# Patient Record
Sex: FEMALE | Race: WHITE | NOT HISPANIC OR LATINO | Employment: OTHER | URBAN - METROPOLITAN AREA
[De-identification: names, ages, dates, MRNs, and addresses within clinical notes are randomized per-mention and may not be internally consistent; named-entity substitution may affect disease eponyms.]

---

## 2017-01-03 ENCOUNTER — GENERIC CONVERSION - ENCOUNTER (OUTPATIENT)
Dept: OTHER | Facility: OTHER | Age: 57
End: 2017-01-03

## 2017-01-05 ENCOUNTER — ALLSCRIPTS OFFICE VISIT (OUTPATIENT)
Dept: OTHER | Facility: OTHER | Age: 57
End: 2017-01-05

## 2017-03-21 ENCOUNTER — GENERIC CONVERSION - ENCOUNTER (OUTPATIENT)
Dept: OTHER | Facility: OTHER | Age: 57
End: 2017-03-21

## 2017-06-15 ENCOUNTER — GENERIC CONVERSION - ENCOUNTER (OUTPATIENT)
Dept: OTHER | Facility: OTHER | Age: 57
End: 2017-06-15

## 2017-06-27 ENCOUNTER — GENERIC CONVERSION - ENCOUNTER (OUTPATIENT)
Dept: OTHER | Facility: OTHER | Age: 57
End: 2017-06-27

## 2017-08-23 ENCOUNTER — TRANSCRIBE ORDERS (OUTPATIENT)
Dept: ADMINISTRATIVE | Facility: HOSPITAL | Age: 57
End: 2017-08-23

## 2017-08-23 DIAGNOSIS — R91.1 LUNG NODULE: Primary | ICD-10-CM

## 2017-08-23 DIAGNOSIS — E04.1 NONTOXIC SINGLE THYROID NODULE: ICD-10-CM

## 2017-08-23 DIAGNOSIS — Z12.31 ENCOUNTER FOR SCREENING MAMMOGRAM FOR MALIGNANT NEOPLASM OF BREAST: ICD-10-CM

## 2017-08-24 ENCOUNTER — TRANSCRIBE ORDERS (OUTPATIENT)
Dept: ADMINISTRATIVE | Facility: HOSPITAL | Age: 57
End: 2017-08-24

## 2017-08-24 DIAGNOSIS — R10.9 ABDOMINAL PAIN, UNSPECIFIED LOCATION: Primary | ICD-10-CM

## 2017-08-25 ENCOUNTER — HOSPITAL ENCOUNTER (OUTPATIENT)
Dept: RADIOLOGY | Age: 57
Discharge: HOME | End: 2017-08-25
Payer: OTHER GOVERNMENT

## 2017-08-25 DIAGNOSIS — R10.9 ABDOMINAL PAIN, UNSPECIFIED LOCATION: ICD-10-CM

## 2017-08-25 PROCEDURE — 76700 US EXAM ABDOM COMPLETE: CPT

## 2017-08-29 ENCOUNTER — HOSPITAL ENCOUNTER (OUTPATIENT)
Dept: RADIOLOGY | Facility: HOSPITAL | Age: 57
Discharge: HOME | End: 2017-08-29
Payer: OTHER GOVERNMENT

## 2017-08-29 ENCOUNTER — HOSPITAL ENCOUNTER (OUTPATIENT)
Dept: RADIOLOGY | Facility: HOSPITAL | Age: 57
Discharge: HOME | End: 2017-08-29
Attending: FAMILY MEDICINE
Payer: OTHER GOVERNMENT

## 2017-08-29 DIAGNOSIS — Z12.31 ENCOUNTER FOR SCREENING MAMMOGRAM FOR MALIGNANT NEOPLASM OF BREAST: ICD-10-CM

## 2017-08-29 DIAGNOSIS — R91.1 LUNG NODULE: ICD-10-CM

## 2017-08-29 PROCEDURE — 77063 BREAST TOMOSYNTHESIS BI: CPT

## 2017-08-29 PROCEDURE — G0202 SCR MAMMO BI INCL CAD: HCPCS

## 2017-08-29 PROCEDURE — 71260 CT THORAX DX C+: CPT

## 2017-08-29 RX ADMIN — IOHEXOL 85 ML: 350 INJECTION, SOLUTION INTRAVENOUS at 11:08

## 2017-09-01 ENCOUNTER — GENERIC CONVERSION - ENCOUNTER (OUTPATIENT)
Dept: OTHER | Facility: OTHER | Age: 57
End: 2017-09-01

## 2017-11-21 ENCOUNTER — TRANSCRIBE ORDERS (OUTPATIENT)
Dept: ADMINISTRATIVE | Facility: HOSPITAL | Age: 57
End: 2017-11-21

## 2017-11-21 DIAGNOSIS — R10.13 ABDOMINAL PAIN, EPIGASTRIC: Primary | ICD-10-CM

## 2017-11-30 ENCOUNTER — HOSPITAL ENCOUNTER (OUTPATIENT)
Dept: RADIOLOGY | Facility: HOSPITAL | Age: 57
Discharge: HOME | End: 2017-11-30
Payer: OTHER GOVERNMENT

## 2017-11-30 VITALS — BODY MASS INDEX: 21.24 KG/M2 | WEIGHT: 118 LBS

## 2017-11-30 DIAGNOSIS — R10.13 ABDOMINAL PAIN, EPIGASTRIC: ICD-10-CM

## 2017-11-30 PROCEDURE — 78227 HEPATOBIL SYST IMAGE W/DRUG: CPT

## 2017-11-30 PROCEDURE — A9537 TC99M MEBROFENIN: HCPCS

## 2018-01-11 NOTE — RESULT NOTES
Verified Results  CT CHEST W CONTRAST [id number] 11:03AM Guichodi Running     Test Name Result Flag Reference   CT CHEST W CONTRAST (Report)     CT CHEST WITH IV CONTRAST     INDICATION: Coughing  Follow-up pulmonary nodules described on outside radiographic chest series  The patient has no smoking history  COMPARISON: Chest radiographic series report dated 11/27/2016  TECHNIQUE: CT examination of the chest was performed  Axial, sagittal and coronal reformatted projections were created  This examination, like all CT scans performed in the [facility], was performed utilizing techniques to minimize    radiation dose exposure, including the use of iterative reconstruction and automated exposure control  IV Contrast: iohexol (OMNIPAQUE) 350 MG/ML injection (MULTI-DOSE) 85 mL Note: (SINGLE DOSE/MULTI DOSE) information refers to the container from which the contrast was acquired  Contrast was injected one time intravenously without immediate    complication  FINDINGS:     LUNGS: There are focal groundglass nodular infiltrates identified including in the left lower lobe (3/26) and within the lateral segment right middle lobe (3/33)  A short-term 3 month follow-up recommended to assess stability  Enhancing opacity in the   medial segment right middle lobe likely represents atelectasis which was described as a nodule on the recent chest radiographic series  Again, surveillance recommended  Finally, there is a 3 mm noncalcified nodule in the peripheral right lower lobe    (3/33)  According to guidelines by the Fleischner society (Radiology 2005; 614:484-004) no followup is needed in patients with low risk for lung cancer, but a 1 year followup is recommended in patients with higher risk for lung cancer such as smokers  Patients with a known malignancy who are therefore at increased risk of metastasis should receive three month follow-up initially  PLEURA: No pleural effusions  HEART/GREAT VESSELS: Unremarkable for patient's age  No pericardial effusion  MEDIASTINUM AND NICK: Several tiny calcified right paramediastinal lymph nodes likely granulomatous  CHEST WALL AND LOWER NECK: Unremarkable  VISUALIZED STRUCTURES IN THE UPPER ABDOMEN: Unremarkable  OSSEOUS STRUCTURES: No acute fracture  No destructive osseous lesion  IMPRESSION:       1  Scattered right middle and left lower lobe groundglass infiltrates likely infectious or inflammatory  In addition, enhancing opacity in the medial segment right middle lobe is most consistent with round atelectasis  A 3 month follow-up however is    recommended for the described findings  2  3 mm right lower lobe nodule  According to guidelines by the Fleischner society (Radiology 2005; 401:885-108) no followup is needed in patients with low risk for lung cancer, but a 1 year followup is recommended in patients with higher risk for lung    cancer such as smokers  Patients with a known malignancy who are therefore at increased risk of metastasis should receive three month follow-up initially        ##fuslh3##fuslh3       ##sigslh##sigslh       Workstation performed: [id number]     Signed by:   Patrica Pizarro MD   12/7/16       Plan  Cough    · Doxycycline Hyclate 100 MG Oral Tablet; TAKE 1 TABLET EVERY 12 HOURS  DAILY

## 2018-01-12 NOTE — RESULT NOTES
Verified Results  (1) URINE CULTURE [id number] 12:00AM Tomi Button     Test Name Result Flag Reference   Result 1 Escherichia coli A    Greater than 100,000 colony forming units per mL   Urine Culture,Comprehensive Final report A    Antimicrobial Susceptibility Comment     ** S = Susceptible; I = Intermediate; R = Resistant **                     P = Positive; N = Negative              MICS are expressed in micrograms per mL     Antibiotic                 RSLT#1    RSLT#2    RSLT#3    RSLT#4  Amoxicillin/Clavulanic Acid    S  Ampicillin                     R  Cefepime                       S  Ceftriaxone                    S  Cephalothin                    I  Ciprofloxacin                  S  Ertapenem                      S  Gentamicin                     S  Imipenem                       S  Levofloxacin                   S  Nitrofurantoin                 S  Piperacillin                   R  Tetracycline                   R  Tobramycin                     S  Trimethoprim/Sulfa             R   Result 1 Escherichia coli A    Greater than 100,000 colony forming units per mL   Urine Culture,Comprehensive Final report A    Antimicrobial Susceptibility Comment     ** S = Susceptible; I = Intermediate; R = Resistant **                     P = Positive; N = Negative              MICS are expressed in micrograms per mL     Antibiotic                 RSLT#1    RSLT#2    RSLT#3    RSLT#4  Amoxicillin/Clavulanic Acid    S  Ampicillin                     R  Cefepime                       S  Ceftriaxone                    S  Cephalothin                    I  Ciprofloxacin                  S  Ertapenem                      S  Gentamicin                     S  Imipenem                       S  Levofloxacin                   S  Nitrofurantoin                 S  Piperacillin                   R  Tetracycline                   R  Tobramycin                     S  Trimethoprim/Sulfa             R             Discussion/Summary changed bactrim to cipro

## 2018-01-14 VITALS
DIASTOLIC BLOOD PRESSURE: 80 MMHG | SYSTOLIC BLOOD PRESSURE: 130 MMHG | BODY MASS INDEX: 20.49 KG/M2 | WEIGHT: 120 LBS | HEIGHT: 64 IN

## 2018-01-14 NOTE — MISCELLANEOUS
Message  pt called stating she tried calling the cardiologist and they could not accommodate any apt until January 2017  I spoke with Simone Hankins- stated ok to see primary care and get a EKG and ECHO  made patient aware of recommendations  Active Problems    1  Acute UTI (599 0) (N39 0)   2  Body mass index (BMI) of 20 0 to 20 9 in adult (V85 1) (Z68 20)   3  Chest pain, unspecified type (786 50) (R07 9)   4  Colon cancer screening (V76 51) (Z12 11)   5  Dense breasts (793 82) (R92 2)   6  Encounter for gynecological examination without abnormal finding (V72 31) (Z01 419)   7  Encounter for screening mammogram for malignant neoplasm of breast (V76 12)   (Z12 31)   8  Encounter for screening mammogram for malignant neoplasm of breast (V76 12)   (Z12 31)   9  Fatigue (780 79) (R53 83)   10  Hereditary breast and ovarian cancer syndrome (V84 09) (Z15 09)   11  Left foot pain (729 5) (M79 672)   12  Pre-op testing (V72 84) (Z01 818)   13  Routine health maintenance (V70 0) (Z00 00)   14  Screening for cardiovascular condition (V81 2) (Z13 6)    Current Meds   1  No Reported Medications Recorded    Allergies    1   Penicillin V Potassium TABS    Signatures   Electronically signed by : Chavo Epps LPN; Nov 30 8106  8:89QA EST                       (Author)

## 2018-01-15 NOTE — MISCELLANEOUS
Message  pt called re cough and "lung spasms"  seen last week, tx with zpack  proair called today  she states she has proair and wanted flovent (her last doctor did that for her)  I advised she should be seen if needs steroid inhaler and evaluated  she is new pt and we can't refill things we have no record of  refused to use mucinex products since "they don't help"  advised appt tomorrow, she may need cxr pending eval      Signatures   Electronically signed by : Anthony Curiel DO;  Apr 15 2016  6:51PM EST                       (Author)

## 2018-01-15 NOTE — RESULT NOTES
Verified Results  * XR FOOT 3+ VIEW LEFT [id number] 03:22PM Liborio Olivarez     Test Name Result Flag Reference   XR FOOT 3+ VW LEFT (Report)     LEFT FOOT     INDICATION: Pain in the left 3rd, 4th and 5th metatarsals  No trauma  COMPARISON: None     VIEWS: 3; 3 images     FINDINGS:     There is no acute fracture or dislocation  No degenerative changes  No lytic or blastic lesions are seen  Soft tissues are unremarkable  IMPRESSION:     Normal examination         Workstation performed: [id number]     Signed by:   Arcelia Webster MD   1/3/17       Discussion/Summary   Perfecto Hopper,   Your foot x-ray is normal   Dr Cece Johansen

## 2018-01-16 NOTE — RESULT NOTES
Verified Results  (1) CBC/PLT/DIFF [id number] 10:13AM Sotero Fernandez     Test Name Result Flag Reference   WBC 9 5 x10E3/uL  3 4-10 8   RBC 4 35 x10E6/uL  3 77-5 28   Hemoglobin 13 1 g/dL  11 1-15 9   Hematocrit 38 6 %  34 0-46  6   MCV 89 fL  79-97   MCH 30 1 pg  26 6-33 0   MCHC 33 9 g/dL  31 5-35 7   RDW 13 6 %  12 3-15 4   Platelets 165 R00H8/LX  150-379   Neutrophils 59 %     Lymphs 29 %     Monocytes 9 %     Eos 3 %     Basos 0 %     Neutrophils (Absolute) 5 5 x10E3/uL  1 4-7 0   Lymphs (Absolute) 2 8 x10E3/uL  0 7-3 1   Monocytes(Absolute) 0 9 x10E3/uL  0 1-0 9   Eos (Absolute) 0 3 x10E3/uL  0 0-0 4   Baso (Absolute) 0 0 x10E3/uL  0 0-0 2   Immature Granulocytes 0 %     Immature Grans (Abs) 0 0 x10E3/uL  0 0-0 1     (1) COMPREHENSIVE METABOLIC PANEL [id number] 64:04TG Sotero Fernandez     Test Name Result Flag Reference   Glucose, Serum 87 mg/dL  65-99   BUN 15 mg/dL  6-24   Creatinine, Serum 0 69 mg/dL  0 57-1 00   eGFR If NonAfricn Am 97 mL/min/1 73  >59   eGFR If Africn Am 112 mL/min/1 73  >59   BUN/Creatinine Ratio 22  9-23   Sodium, Serum 138 mmol/L  136-144   Potassium, Serum 4 6 mmol/L  3 5-5 2   Chloride, Serum 98 mmol/L     Carbon Dioxide, Total 25 mmol/L  18-29   Calcium, Serum 9 2 mg/dL  8 7-10 2   Protein, Total, Serum 6 4 g/dL  6 0-8 5   Albumin, Serum 4 2 g/dL  3 5-5 5   Globulin, Total 2 2 g/dL  1 5-4 5   A/G Ratio 1 9  1 1-2 5   Bilirubin, Total <0 2 mg/dL  0 0-1 2   Alkaline Phosphatase, S 59 IU/L     AST (SGOT) 20 IU/L  0-40   ALT (SGPT) 13 IU/L  0-32     (1) LIPID PANEL FASTING W DIRECT LDL REFLEX [id number] 10:13AM Sotero Fernandez     Test Name Result Flag Reference   Cholesterol, Total 199 mg/dL  100-199   Triglycerides 41 mg/dL  0-149   HDL Cholesterol 81 mg/dL  >39   LDL Cholesterol Calc 110 mg/dL H 0-99     (1) TSH [id number] 10:13AM Sotero Fernandez     Test Name Result Flag Reference   TSH 1 900 uIU/mL  0 450-4 500     General acute hospital) Cardiovascular Risk Assessment [id number] 10:13AM Sotero Fernandez Test Name Result Flag Reference   Interpretation Note     Supplement report is available  PDF Image   (LC) Lyme Ab/Western Blot Reflex [id number] 10:13AM Zofia Peralta     Test Name Result Flag Reference   Lyme IgG/IgM Ab 0 91 ISR H 0 00-0 90   Negative         <0 91                                                 Equivocal  0 91 - 1 09                                                 Positive         >1 09   Lyme Disease Ab, Quant, IgM 1 24 index H 0 00-0 79   Negative         <0 80                                                 Equivocal  0 80 - 1 19                                                 Positive         >1 19                  IgM levels may peak at 3-6 weeks post infection, then                  gradually decline  IgG P93 Ab  Absent     IgG P66 Ab  Present A    IgG P58 Ab  Present A    IgG P45 Ab  Absent     IgG P41 Ab  Present A    IgG P39 Ab  Absent     IgG P30 Ab  Present A    IgG P28 Ab  Absent     IgG P23 Ab  Absent     IgG P18 Ab  Absent     Lyme IgG WB Interp  Negative     Positive: 5 of the following                                                Borrelia-specific bands:                                                18,23,28,30,39,41,45,58,                                                66, and 93  Negative: No bands or banding                                                patterns which do not                                                meet positive criteria  IgM P41 Ab  Present A    IgM P39 Ab  Absent     IgM P23 Ab  Absent     Lyme IgM WB Interp  Negative     Note: An equivocal or positive EIA result followed by a negative  Western Blot result is considered NEGATIVE  An equivocal or positive  EIA result followed by a positive Western Blot is considered POSITIVE  by the CDC  Positive: 2 of the following bands: 23,39 or 41  Negative: No bands or banding patterns which do not meet positive  criteria    Criteria for positivity are those recommended by CDC/ASTPHLD   p23=Osp C, l05=sgaxsstii  Note:  Sera from individuals with the following may cross react in the  Lyme Western Blot assays: other spirochetal diseases (periodontal  disease, leptospirosis, relapsing fever, yaws, and pinta);  connective autoimmune (Rheumatoid Arthritis and Systemic Lupus  Erythematosus and also individuals with Antinuclear Antibody);  other infections COFFEE [facility] Spotted Fever; Ankur-Barr Virus,  and Cytomegalovirus)  Discussion/Summary   Juanpablo Huizar,   Your blood count, sugar, kidney function, liver function, cholesterol and thyroid are normal    Lyme test is negative     Dr German Saucedo

## 2018-01-16 NOTE — RESULT NOTES
Verified Results  CT CHEST W CONTRAST [id number] 10:26AM Sotero Fernandez     Test Name Result Flag Reference   CT CHEST W CONTRAST (Report)     CT CHEST WITH IV CONTRAST     INDICATION: 58-year-old female for follow-up pulmonary nodule  The patient has no smoking history  COMPARISON: 12/6/2016  TECHNIQUE: CT examination of the chest was performed  Reformatted images were created in axial, sagittal, and coronal planes  Radiation dose length product (DLP) for this visit: 161 36 mGy-cm   This examination, like all CT scans performed in the [facility], was performed utilizing techniques to minimize radiation dose exposure, including the use of iterative   reconstruction and automated exposure control  IV Contrast: 85 mL of iohexol (OMNIPAQUE)        FINDINGS:     LUNGS: There has been resolution of the right middle and left lower lobe groundglass infiltrates likely infectious or inflammatory  The 3 mm right lower lobe nodule has diminished in conspicuity with no new parenchymal or endobronchial lesions seen  PLEURA: No pleural effusions or pneumothorax  HEART/GREAT VESSELS: Unremarkable for patient's age  MEDIASTINUM AND NICK: No pathologic lymphadenopathy  CHEST WALL AND LOWER NECK: Benign-appearing low-density 7 mm right upper pole nodule noted  VISUALIZED STRUCTURES IN THE UPPER ABDOMEN: Unremarkable  OSSEOUS STRUCTURES: No acute fracture  No destructive osseous lesion  IMPRESSION:       1  Interval resolution of right middle and left lower lobe groundglass infiltrates, likely infectious or inflammatory  2  Diminished size and conspicuity of 3 mm right lower lobe nodule  No further surveillance required  3  Suspect 7 mm right upper pole thyroid nodule  A baseline nonemergent thyroid ultrasound recommended               Workstation performed: [id number]     Signed by:   Patrica Pizarro MD   8/30/17

## 2018-05-12 ENCOUNTER — HOSPITAL ENCOUNTER (EMERGENCY)
Facility: HOSPITAL | Age: 58
Discharge: HOME | End: 2018-05-12
Attending: EMERGENCY MEDICINE | Admitting: EMERGENCY MEDICINE
Payer: COMMERCIAL

## 2018-05-12 ENCOUNTER — APPOINTMENT (EMERGENCY)
Dept: RADIOLOGY | Facility: HOSPITAL | Age: 58
End: 2018-05-12
Payer: COMMERCIAL

## 2018-05-12 VITALS
WEIGHT: 118 LBS | SYSTOLIC BLOOD PRESSURE: 131 MMHG | DIASTOLIC BLOOD PRESSURE: 78 MMHG | BODY MASS INDEX: 20.91 KG/M2 | TEMPERATURE: 97.4 F | OXYGEN SATURATION: 97 % | RESPIRATION RATE: 18 BRPM | HEIGHT: 63 IN | HEART RATE: 63 BPM

## 2018-05-12 DIAGNOSIS — S16.1XXA CERVICAL STRAIN, ACUTE: ICD-10-CM
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
## 2018-05-12 PROCEDURE — 72125 CT NECK SPINE W/O DYE: CPT

## 2018-05-12 PROCEDURE — 99283 EMERGENCY DEPT VISIT LOW MDM: CPT

## 2018-05-12 RX ORDER — METHOCARBAMOL 500 MG/1
500 TABLET, FILM COATED ORAL 2 TIMES DAILY
Qty: 20 TABLET | Refills: 0 | Status: SHIPPED | OUTPATIENT
Start: 2018-05-12 | End: 2018-06-08

## 2018-05-12 RX ORDER — METHOCARBAMOL 500 MG/1
500 TABLET, FILM COATED ORAL ONCE
Status: COMPLETED | OUTPATIENT
Start: 2018-05-12 | End: 2018-05-12

## 2018-05-12 RX ADMIN — METHOCARBAMOL 500 MG: 500 TABLET ORAL at 09:56
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
## 2018-05-12 NOTE — DISCHARGE INSTRUCTIONS
Cervical Strain   WHAT YOU NEED TO KNOW:   A cervical strain is a stretched or torn muscle or tendon in your neck  Tendons are strong tissues that connect muscles to bones  Common causes of cervical strains include a car accident, a fall, or a sports injury  DISCHARGE INSTRUCTIONS:   Return to the emergency department if:   · You have pain or numbness from your shoulder down to your hand  · You have problems with your vision, hearing, or balance  · You feel confused or cannot concentrate  · You have problems with movement and strength  Contact your healthcare provider if:   · You have increased swelling or pain in your neck  · You have questions or concerns about your condition or care  Medicines: You may need any of the following:  · Acetaminophen  decreases pain and fever  It is available without a doctor's order  Ask how much to take and how often to take it  Follow directions  Read the labels of all other medicines you are using to see if they also contain acetaminophen, or ask your doctor or pharmacist  Acetaminophen can cause liver damage if not taken correctly  Do not use more than 4 grams (4,000 milligrams) total of acetaminophen in one day  · NSAIDs , such as ibuprofen, help decrease swelling, pain, and fever  This medicine is available with or without a doctor's order  NSAIDs can cause stomach bleeding or kidney problems in certain people  If you take blood thinner medicine, always ask your healthcare provider if NSAIDs are safe for you  Always read the medicine label and follow directions  · Muscle relaxers  help decrease pain and muscle spasms  · Prescription pain medicine  may be given  Ask your healthcare provider how to take this medicine safely  Some prescription pain medicines contain acetaminophen  Do not take other medicines that contain acetaminophen without talking to your healthcare provider  Too much acetaminophen may cause liver damage   Prescription pain medicine may cause constipation  Ask your healthcare provider how to prevent or treat constipation  · Take your medicine as directed  Contact your healthcare provider if you think your medicine is not helping or if you have side effects  Tell him or her if you are allergic to any medicine  Keep a list of the medicines, vitamins, and herbs you take  Include the amounts, and when and why you take them  Bring the list or the pill bottles to follow-up visits  Carry your medicine list with you in case of an emergency  Manage your symptoms:   · Apply heat  on your neck for 15 to 20 minutes, 4 to 6 times a day or as directed  Heat helps decrease pain, stiffness, and muscle spasms  · Begin gentle neck exercises  as soon as you can move your neck without pain  Exercises will help decrease stiffness and improve the strength and movement of your neck  Ask your healthcare provider what kind of exercises you should do  · Gradually return to your usual activities as directed  Stop if you have pain  Avoid activities that can cause more damage to your neck, such as heavy lifting or strenuous exercise  · Sleep without a pillow  to help decrease pain  Instead, roll a small towel tightly and place it under your neck  · Go to physical therapy as directed  A physical therapist teaches you exercises to help improve movement and strength, and to decrease pain  Prevent neck injury:   · Drive safely  Make sure everyone in your car wears a seatbelt  A seatbelt can save your life if you are in an accident  Do not use your cell phone when you are driving  This could distract you and cause an accident  Pull over if you need to make a call or send a text message  · Wear helmets, lifejackets, and protective gear  Always wear a helmet when you ride a bike or motorcycle, go skiing, or play sports that could cause a head injury  Wear protective equipment when you play sports   Wear a lifejacket when you are on a boat or doing water sports  Follow up with your healthcare provider as directed: You may be referred to an orthopedist or physical therapies  Write down your questions so you remember to ask them during your visits  © 2017 [employer] Information is for End User's use only and may not be sold, redistributed or otherwise used for commercial purposes  All illustrations and images included in CareNotes® are the copyrighted property of A D [employer] M [employer] Inc  or [employer]  The above information is an  only  It is not intended as medical advice for individual conditions or treatments  Talk to your doctor, nurse or pharmacist before following any medical regimen to see if it is safe and effective for you

## 2018-05-15 ENCOUNTER — VBI (OUTPATIENT)
Dept: ADMINISTRATIVE | Facility: OTHER | Age: 58
End: 2018-05-15

## 2018-05-25 ENCOUNTER — TELEPHONE (OUTPATIENT)
Dept: FAMILY MEDICINE CLINIC | Facility: CLINIC | Age: 58
End: 2018-05-25

## 2018-05-25 DIAGNOSIS — M54.2 NECK PAIN: Primary | ICD-10-CM
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
## 2018-05-26 NOTE — TELEPHONE ENCOUNTER
Called and phone just kept ringing, could not leave message  Order in chart if she calls or PT has any questions  Yumiko Card

## 2018-05-29 ENCOUNTER — TELEPHONE (OUTPATIENT)
Dept: FAMILY MEDICINE CLINIC | Facility: CLINIC | Age: 58
End: 2018-05-29

## 2018-05-29 ENCOUNTER — EVALUATION (OUTPATIENT)
Dept: PHYSICAL THERAPY | Facility: CLINIC | Age: 58
End: 2018-05-29
Payer: COMMERCIAL

## 2018-05-29 DIAGNOSIS — M54.2 CERVICALGIA: Primary | ICD-10-CM

## 2018-05-29 DIAGNOSIS — M54.2 NECK PAIN: Primary | ICD-10-CM

## 2018-05-29 PROCEDURE — 97161 PT EVAL LOW COMPLEX 20 MIN: CPT

## 2018-05-29 PROCEDURE — G8985 CARRY GOAL STATUS: HCPCS

## 2018-05-29 PROCEDURE — G8984 CARRY CURRENT STATUS: HCPCS

## 2018-05-29 NOTE — TELEPHONE ENCOUNTER
She needs to be evaluated in the office if she not doing well on her current medication     Radha Andrade, DO
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
## 2018-05-30 RX ORDER — NABUMETONE 500 MG/1
500 TABLET, FILM COATED ORAL 2 TIMES DAILY PRN
Qty: 30 TABLET | Refills: 0 | Status: SHIPPED | OUTPATIENT
Start: 2018-05-30 | End: 2018-06-08

## 2018-05-30 NOTE — TELEPHONE ENCOUNTER
5/30/2018 10:14 AM returned call to [facility] and she would just like a stronger anti-inflammatory  We discussed options and started her on relafen    Risks and benefits of medication discussed     Aylin Cancino, DO

## 2018-05-31 ENCOUNTER — APPOINTMENT (OUTPATIENT)
Dept: PHYSICAL THERAPY | Facility: CLINIC | Age: 58
End: 2018-05-31
Payer: COMMERCIAL

## 2018-06-08 ENCOUNTER — OFFICE VISIT (OUTPATIENT)
Dept: FAMILY MEDICINE CLINIC | Facility: CLINIC | Age: 58
End: 2018-06-08
Payer: COMMERCIAL

## 2018-06-08 VITALS
BODY MASS INDEX: 21.26 KG/M2 | RESPIRATION RATE: 18 BRPM | WEIGHT: 120 LBS | DIASTOLIC BLOOD PRESSURE: 88 MMHG | SYSTOLIC BLOOD PRESSURE: 120 MMHG | HEART RATE: 82 BPM | HEIGHT: 63 IN | TEMPERATURE: 98.7 F

## 2018-06-08 DIAGNOSIS — S16.1XXA STRAIN OF NECK MUSCLE, INITIAL ENCOUNTER: ICD-10-CM

## 2018-06-08 DIAGNOSIS — M54.2 CERVICALGIA: Primary | ICD-10-CM

## 2018-06-08 PROCEDURE — 99213 OFFICE O/P EST LOW 20 MIN: CPT | Performed by: NURSE PRACTITIONER
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
## 2018-06-08 NOTE — LETTER
June 8, 2018     Patient: Estee Frausto   YOB: 1960   Date of Visit: 6/8/2018       To Whom it May Concern: Chao Benny is under my professional care  She was seen in my office on 6/8/2018  If you have any questions or concerns, please don't hesitate to call           Sincerely,          PATI Martinez        CC: No Recipients

## 2018-06-15 ENCOUNTER — TELEPHONE (OUTPATIENT)
Dept: FAMILY MEDICINE CLINIC | Facility: CLINIC | Age: 58
End: 2018-06-15

## 2018-06-15 DIAGNOSIS — M54.2 CERVICALGIA: Primary | ICD-10-CM

## 2018-06-15 RX ORDER — LIDOCAINE 50 MG/G
1 PATCH TOPICAL DAILY
Qty: 30 PATCH | Refills: 0 | Status: SHIPPED | OUTPATIENT
Start: 2018-06-15 | End: 2019-12-05 | Stop reason: ALTCHOICE

## 2018-06-28 ENCOUNTER — TELEPHONE (OUTPATIENT)
Dept: PHYSICAL THERAPY | Facility: CLINIC | Age: 58
End: 2018-06-28

## 2018-06-28 PROBLEM — M54.2 CERVICALGIA: Status: RESOLVED | Noted: 2018-06-15 | Resolved: 2018-06-28

## 2018-06-28 NOTE — TELEPHONE ENCOUNTER
Spoke with patient re: lack of attendance in PT  She stated she phoned and cancelled all appointment with us

## 2018-07-17 ENCOUNTER — OFFICE VISIT (OUTPATIENT)
Dept: FAMILY MEDICINE CLINIC | Facility: CLINIC | Age: 58
End: 2018-07-17
Payer: OTHER GOVERNMENT

## 2018-07-17 VITALS
WEIGHT: 119 LBS | BODY MASS INDEX: 21.09 KG/M2 | RESPIRATION RATE: 18 BRPM | TEMPERATURE: 98 F | SYSTOLIC BLOOD PRESSURE: 146 MMHG | DIASTOLIC BLOOD PRESSURE: 84 MMHG | HEIGHT: 63 IN | HEART RATE: 84 BPM

## 2018-07-17 DIAGNOSIS — R05.9 COUGH: ICD-10-CM

## 2018-07-17 DIAGNOSIS — Z13.6 SCREENING FOR CARDIOVASCULAR CONDITION: ICD-10-CM

## 2018-07-17 DIAGNOSIS — R53.83 FATIGUE, UNSPECIFIED TYPE: ICD-10-CM

## 2018-07-17 DIAGNOSIS — E04.1 THYROID NODULE: ICD-10-CM
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
## 2018-07-17 DIAGNOSIS — S16.1XXD CERVICAL MYOFASCIAL STRAIN, SUBSEQUENT ENCOUNTER: ICD-10-CM

## 2018-07-17 PROCEDURE — 99213 OFFICE O/P EST LOW 20 MIN: CPT | Performed by: NURSE PRACTITIONER

## 2018-07-17 PROCEDURE — 99214 OFFICE O/P EST MOD 30 MIN: CPT | Performed by: NURSE PRACTITIONER
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
## 2018-07-17 NOTE — PROGRESS NOTES
Assessment/Plan:    Fatigue new  Labs ordered  Reviewed CT of chest, which demonstrated 7mm thyroid nodule  Thyroid US ordered for further evaluation  Follow up after studies  Problem List Items Addressed This Visit        Endocrine    Thyroid nodule    Relevant Orders    US thyroid    TSH, 3rd generation with Free T4 reflex    Vitamin D 25 hydroxy      Other Visit Diagnoses     Fatigue, unspecified type    -  Primary    Relevant Orders    US thyroid    CBC and differential    Comprehensive metabolic panel    EBV acute panel    Vitamin D 25 hydroxy    Lyme Antibody Profile with reflex to WB    Cough        Relevant Orders    Vitamin D 25 hydroxy    Screening for cardiovascular condition        Relevant Orders    Lipid panel                                          There are no Patient Instructions on file for this visit  Return for Next scheduled follow up  Subjective:      Patient ID: Salvatore Carmen is a 60 y o  female  Chief Complaint   Patient presents with    Follow-up     prior [facility]-       Here today with complaints of excessive fatigue  This has been ongoing for the past several weeks  Some days she feels ready to go to bed at 7pm     She is concerned because she had a lung nodule and is thinks this may be related  Due for labs  She has had an on and off cough which she attributes to allergies  No personal history of thyroid disease, however was told that she had a nodule, for which an US was ordered, but she did not go  No family history of thyroid disease  No rashes or recent illness, but her throat does feel a little sore today  The following portions of the patient's history were reviewed and updated as appropriate: allergies, current medications, past family history, past medical history, past social history, past surgical history and problem list     Review of Systems   Constitutional: Positive for fatigue   Negative for appetite change, chills, fever and unexpected weight change  Respiratory: Negative for cough, shortness of breath and wheezing  Cardiovascular: Negative for chest pain, palpitations and leg swelling  Gastrointestinal: Negative for abdominal distention, constipation, nausea and vomiting  Musculoskeletal: Negative for arthralgias  Skin: Negative for rash  Neurological: Negative for weakness and headaches  All other systems reviewed and are negative  Current Outpatient Prescriptions   Medication Sig Dispense Refill    lidocaine (LIDODERM) 5 % Place 1 patch on the skin daily Remove & Discard patch within 12 hours or as directed by MD 30 patch 0    RESTASIS MULTIDOSE 0 05 % ophthalmic emulsion        No current facility-administered medications for this visit  Objective:    /84   Pulse 84   Temp 98 °F (36 7 °C)   Resp 18   Ht 5' 3" (1 6 m)   Wt 54 kg (119 lb)   BMI 21 08 kg/m²        Physical Exam   Constitutional: She appears well-developed and well-nourished  HENT:   Right Ear: Tympanic membrane, external ear and ear canal normal    Left Ear: Tympanic membrane, external ear and ear canal normal    Nose: No mucosal edema  Mouth/Throat: Oropharynx is clear and moist and mucous membranes are normal    Eyes: Conjunctivae are normal    Cardiovascular: Normal rate, regular rhythm and normal heart sounds  Pulmonary/Chest: Effort normal and breath sounds normal    Abdominal: Bowel sounds are normal  She exhibits no distension  There is no splenomegaly or hepatomegaly  There is no tenderness  Lymphadenopathy:        Right cervical: No superficial cervical adenopathy present  Left cervical: No superficial cervical adenopathy present  Skin: No rash noted  Psychiatric: She has a normal mood and affect  Nursing note and vitals reviewed               Jennifer Douglas

## 2018-12-14 ENCOUNTER — TRANSCRIBE ORDERS (OUTPATIENT)
Dept: ADMINISTRATIVE | Facility: HOSPITAL | Age: 58
End: 2018-12-14

## 2018-12-14 DIAGNOSIS — Z12.39 SCREENING BREAST EXAMINATION: Primary | ICD-10-CM

## 2018-12-14 DIAGNOSIS — Z80.3 FAMILY HISTORY OF BREAST CANCER: ICD-10-CM

## 2018-12-18 ENCOUNTER — HOSPITAL ENCOUNTER (OUTPATIENT)
Dept: RADIOLOGY | Facility: HOSPITAL | Age: 58
Discharge: HOME | End: 2018-12-18
Payer: OTHER GOVERNMENT

## 2018-12-18 ENCOUNTER — TELEPHONE (OUTPATIENT)
Dept: OBGYN CLINIC | Facility: CLINIC | Age: 58
End: 2018-12-18

## 2018-12-18 VITALS — HEIGHT: 63 IN | WEIGHT: 120 LBS | BODY MASS INDEX: 21.26 KG/M2

## 2018-12-18 DIAGNOSIS — Z12.31 ENCOUNTER FOR SCREENING MAMMOGRAM FOR MALIGNANT NEOPLASM OF BREAST: Primary | ICD-10-CM

## 2018-12-18 DIAGNOSIS — Z80.3 FAMILY HISTORY OF BREAST CANCER: ICD-10-CM

## 2018-12-18 DIAGNOSIS — Z12.39 SCREENING BREAST EXAMINATION: ICD-10-CM

## 2018-12-18 DIAGNOSIS — R53.83 FATIGUE, UNSPECIFIED TYPE: ICD-10-CM

## 2018-12-18 DIAGNOSIS — E04.1 THYROID NODULE: ICD-10-CM

## 2018-12-18 PROCEDURE — 77067 SCR MAMMO BI INCL CAD: CPT

## 2018-12-18 PROCEDURE — 77063 BREAST TOMOSYNTHESIS BI: CPT

## 2018-12-18 PROCEDURE — 76536 US EXAM OF HEAD AND NECK: CPT

## 2019-07-24 ENCOUNTER — TELEPHONE (OUTPATIENT)
Dept: FAMILY MEDICINE CLINIC | Facility: CLINIC | Age: 59
End: 2019-07-24
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
## 2019-12-05 ENCOUNTER — ANNUAL EXAM (OUTPATIENT)
Dept: OBGYN CLINIC | Facility: CLINIC | Age: 59
End: 2019-12-05
Payer: OTHER GOVERNMENT

## 2019-12-05 VITALS
WEIGHT: 116 LBS | BODY MASS INDEX: 20.55 KG/M2 | HEIGHT: 63 IN | DIASTOLIC BLOOD PRESSURE: 78 MMHG | SYSTOLIC BLOOD PRESSURE: 128 MMHG

## 2019-12-05 DIAGNOSIS — Z11.51 SCREENING FOR HUMAN PAPILLOMAVIRUS (HPV): ICD-10-CM

## 2019-12-05 DIAGNOSIS — Z01.419 ENCOUNTER FOR GYNECOLOGICAL EXAMINATION: Primary | ICD-10-CM

## 2019-12-05 DIAGNOSIS — Z12.31 ENCOUNTER FOR SCREENING MAMMOGRAM FOR MALIGNANT NEOPLASM OF BREAST: ICD-10-CM

## 2019-12-05 PROCEDURE — 87624 HPV HI-RISK TYP POOLED RSLT: CPT | Performed by: OBSTETRICS & GYNECOLOGY

## 2019-12-05 PROCEDURE — G0145 SCR C/V CYTO,THINLAYER,RESCR: HCPCS | Performed by: OBSTETRICS & GYNECOLOGY

## 2019-12-05 PROCEDURE — 99396 PREV VISIT EST AGE 40-64: CPT | Performed by: OBSTETRICS & GYNECOLOGY

## 2019-12-05 NOTE — PROGRESS NOTES
Dyana Uribe   1960    CC:  Yearly exam    S:  60 y o  female here for yearly exam  She is postmenopausal and has had no vaginal bleeding  She denies vaginal discharge, itching, odor or dryness  She had undergone a lap BSO secondary to her family history of breast/ovarian cancer  Her sister had the same surgery with another surgeon but it was apparently complicated by intraperitoneal hemorrhage  She states her sister is now doing well  Sexual activity: She is sexually active without pain, bleeding or dryness  Last Pap: 7/2015 - normal per records  Last Mammo: 12/18/2018 - BIRAD-1  Last Colonoscopy: 5 years ago; normal per patient    We reviewed ASCCP guidelines for Pap testing  Current Outpatient Medications:     RESTASIS MULTIDOSE 0 05 % ophthalmic emulsion, , Disp: , Rfl:   Social History     Socioeconomic History    Marital status: /Civil Union     Spouse name: Not on file    Number of children: Not on file    Years of education: Not on file    Highest education level: Not on file   Occupational History    Not on file   Social Needs    Financial resource strain: Not on file    Food insecurity:     Worry: Not on file     Inability: Not on file    Transportation needs:     Medical: Not on file     Non-medical: Not on file   Tobacco Use    Smoking status: Never Smoker    Smokeless tobacco: Never Used   Substance and Sexual Activity    Alcohol use:  Yes     Alcohol/week: 5 0 standard drinks     Types: 5 Glasses of wine per week     Comment: socially    Drug use: No    Sexual activity: Yes     Birth control/protection: Post-menopausal   Lifestyle    Physical activity:     Days per week: Not on file     Minutes per session: Not on file    Stress: Not on file   Relationships    Social connections:     Talks on phone: Not on file     Gets together: Not on file     Attends [employer] service: Not on file     Active member of club or organization: Not on file     Attends meetings of clubs or organizations: Not on file     Relationship status: Not on file    Intimate partner violence:     Fear of current or ex partner: Not on file     Emotionally abused: Not on file     Physically abused: Not on file     Forced sexual activity: Not on file   Other Topics Concern    Not on file   Social History Narrative    Drinks coffee (2 cups a day)    Lack of exercise     Family History   Problem Relation Age of Onset    Cancer Mother 43        breast    Arthritis Mother     Other Mother         Cardiac disorder    Osteoporosis Mother     Skin cancer Mother     Breast cancer Mother 43    Cancer Sister         breast    Breast cancer Sister 39    Cancer Brother         neck    Diabetes Father     Arthritis Maternal Grandmother     Ovarian cancer Maternal Grandmother 79    Hypertension Maternal Grandfather     Other Paternal Grandfather         Cardiac disorder    Diabetes Other         Paternal Relatives     Past Medical History:   Diagnosis Date    Acute UTI     recently treated    Cataract     bilateral    Endometriosis     Foot pain     Pneumonia     recent patient taking anitbiotics now        Review of Systems   Respiratory: Negative  Cardiovascular: Negative  Gastrointestinal: Negative for constipation and diarrhea  Genitourinary: Negative for difficulty urinating, pelvic pain, vaginal bleeding, vaginal discharge, itching or odor  O:  Blood pressure 128/78, height 5' 3" (1 6 m), weight 52 6 kg (116 lb)  Patient appears well and is not in distress  Neck is supple without masses  Breasts are symmetrical without mass, tenderness, nipple discharge, skin changes or adenopathy  Abdomen is soft and nontender without masses  External genitals are normal without lesions or rashes  Urethral meatus and urethra are normal  Bladder is normal to palpation  Vagina is normal without discharge or bleeding  Cervix is normal without discharge or lesion     Uterus is normal, mobile, nontender without palpable mass  Adnexa are surgically absent without masses  A:  Yearly exam      P:   Pap with HPV done - will call with results  Mammo slip given   Colonoscopy due 5 years   DEXA due age 71 unless risk factors develop sooner    RTO one year for yearly exam or sooner as needed

## 2019-12-07 LAB
HPV HR 12 DNA CVX QL NAA+PROBE: NEGATIVE
HPV16 DNA CVX QL NAA+PROBE: NEGATIVE
HPV18 DNA CVX QL NAA+PROBE: NEGATIVE

## 2019-12-10 LAB
LAB AP GYN PRIMARY INTERPRETATION: NORMAL
Lab: NORMAL

## 2019-12-30 ENCOUNTER — HOSPITAL ENCOUNTER (OUTPATIENT)
Dept: RADIOLOGY | Facility: HOSPITAL | Age: 59
Discharge: HOME | End: 2019-12-30
Payer: OTHER GOVERNMENT

## 2019-12-30 ENCOUNTER — TRANSCRIBE ORDERS (OUTPATIENT)
Dept: ADMINISTRATIVE | Facility: HOSPITAL | Age: 59
End: 2019-12-30

## 2019-12-30 VITALS — BODY MASS INDEX: 20.55 KG/M2 | HEIGHT: 63 IN | WEIGHT: 116 LBS

## 2019-12-30 DIAGNOSIS — Z12.31 ENCOUNTER FOR SCREENING MAMMOGRAM FOR MALIGNANT NEOPLASM OF BREAST: ICD-10-CM

## 2019-12-30 PROCEDURE — 77063 BREAST TOMOSYNTHESIS BI: CPT

## 2019-12-30 PROCEDURE — 77067 SCR MAMMO BI INCL CAD: CPT

## 2020-07-27 ENCOUNTER — OFFICE VISIT (OUTPATIENT)
Dept: FAMILY MEDICINE CLINIC | Facility: CLINIC | Age: 60
End: 2020-07-27
Payer: OTHER GOVERNMENT

## 2020-07-27 DIAGNOSIS — Z11.4 SCREENING FOR HIV (HUMAN IMMUNODEFICIENCY VIRUS): ICD-10-CM

## 2020-07-27 DIAGNOSIS — Z13.6 SCREENING FOR CARDIOVASCULAR CONDITION: ICD-10-CM

## 2020-07-27 DIAGNOSIS — R63.4 UNINTENTIONAL WEIGHT LOSS: ICD-10-CM

## 2020-07-27 DIAGNOSIS — Z11.59 NEED FOR HEPATITIS C SCREENING TEST: ICD-10-CM

## 2020-07-27 DIAGNOSIS — Z12.31 ENCOUNTER FOR SCREENING MAMMOGRAM FOR MALIGNANT NEOPLASM OF BREAST: Primary | ICD-10-CM

## 2020-07-27 PROBLEM — R92.30 DENSE BREAST TISSUE: Status: ACTIVE | Noted: 2020-07-27

## 2020-07-27 PROBLEM — R92.2 DENSE BREAST TISSUE: Status: ACTIVE | Noted: 2020-07-27

## 2020-07-27 PROCEDURE — 1036F TOBACCO NON-USER: CPT | Performed by: NURSE PRACTITIONER

## 2020-07-27 PROCEDURE — 99213 OFFICE O/P EST LOW 20 MIN: CPT | Performed by: NURSE PRACTITIONER

## 2020-07-27 NOTE — PROGRESS NOTES
Virtual Regular Visit      Assessment/Plan:    Will check labs as below  Continue to monitor weight and was instructed to call with continued weight loss, drenching night sweats, abdominal pain, changes in bowel habits, or new symptoms  Problem List Items Addressed This Visit     None      Visit Diagnoses     Encounter for screening mammogram for malignant neoplasm of breast    -  Primary    Unintentional weight loss        Relevant Orders    TSH, 3rd generation with Free T4 reflex    HIV 1/2 Antigen/Antibody (4th Generation) w Reflex [facility]    Screening for HIV (human immunodeficiency virus)        Relevant Orders    HIV 1/2 Antigen/Antibody (4th Generation) w Reflex [facility]    Screening for cardiovascular condition        Relevant Orders    CBC and differential    Comprehensive metabolic panel    Lipid panel    Need for hepatitis C screening test        Relevant Orders    Hepatitis C antibody               Reason for visit is   Chief Complaint   Patient presents with    Needs LABS     [facility]  Virtual Regular Visit        Encounter provider PATI Plascencia    Provider located at [address] [id number]      Recent Visits  No visits were found meeting these conditions  Showing recent visits within past 7 days and meeting all other requirements     Today's Visits  Date Type Provider Dept   07/27/20 Office Visit Zack Plascencia today's visits and meeting all other requirements     Future Appointments  No visits were found meeting these conditions  Showing future appointments within next 150 days and meeting all other requirements        The patient was identified by name and date of birth  Aaron Pérez was informed that this is a telemedicine visit and that the visit is being conducted through [facility] and patient was informed that this is a secure, HIPAA-compliant platform   She agrees to proceed     My office door was closed  No one else was in the room  She acknowledged consent and understanding of privacy and security of the video platform  The patient has agreed to participate and understands they can discontinue the visit at any time  Patient is aware this is a billable service  J Carlos Stevens is a 63 y o  female   She is requesting an order for bloodwork  She has some lost some weight without trying  She has lost approx 5 pound over the past few months  She thinks her weight loss may be secondary to the heat and being active outside  Denies n/v, constipation, diarrhea, or changes in bowel habits  She denies any drenching night sweats or chronic cough  She is up to date with mammograms, colonoscopy, and pap smear  She has a family history of breast cancer and ovarian cancer  Past Medical History:   Diagnosis Date    Acute UTI     recently treated    Cataract     bilateral    Endometriosis     Foot pain     Pneumonia     recent patient taking anitbiotics now       Past Surgical History:   Procedure Laterality Date    CATARACT EXTRACTION Bilateral     COLONOSCOPY      Complete    DIAGNOSTIC LAPAROSCOPY      for fertility;  Per Allscripts: For infertility, endometriosis    NECK SURGERY      closure of hole in front of neck    OTHER SURGICAL HISTORY      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, Infertility surgery    IN XCAPSL CTRC RMVL INSJ IO LENS PROSTH W/O ECP Left 3/7/2016    Procedure: EXTRACTION EXTRACAPSULAR CATARACT PHACO INTRAOCULAR LENS (IOL); Surgeon: Stephen Oscar MD;  [place] [facility] [place]  Service: Ophthalmology    IN XCAPSL CTRC RMVL INSJ IO LENS PROSTH W/O ECP Right 2/8/2016    Procedure: EXTRACTION EXTRACAPSULAR CATARACT PHACO INTRAOCULAR LENS (IOL);   Surgeon: Stephen Oscar MD;  [place] [facility] [place]  Service: Ophthalmology       Current Outpatient Medications   Medication Sig Dispense Refill    RESTASIS MULTIDOSE 0 05 % ophthalmic emulsion        No current facility-administered medications for this visit  Allergies   Allergen Reactions    Aminoglycosides     Penicillin V Rash    Penicillins Rash       Review of Systems   Constitutional: Positive for unexpected weight change  Negative for chills, fatigue and fever  Respiratory: Negative for cough, shortness of breath and wheezing  Cardiovascular: Negative for chest pain, palpitations and leg swelling  Gastrointestinal: Negative for abdominal pain, diarrhea, nausea and vomiting  Skin: Negative for rash  Neurological: Negative for dizziness and headaches  Video Exam    There were no vitals filed for this visit  Physical Exam   Constitutional: She appears well-developed and well-nourished  She does not have a sickly appearance  She does not appear ill  HENT:   Head: Normocephalic and atraumatic  Eyes: Conjunctivae are normal    Neck: Normal range of motion  Pulmonary/Chest: Effort normal  No accessory muscle usage  No tachypnea  No respiratory distress  Neurological: She is alert  Skin: She is not diaphoretic  No pallor  Psychiatric: She has a normal mood and affect  Her speech is normal         I spent 10 minutes directly with the patient during this visit      Jair Martin1 acknowledges that she has consented to an online visit or consultation  She understands that the online visit is based solely on information provided by her, and that, in the absence of a face-to-face physical evaluation by the physician, the diagnosis she receives is both limited and provisional in terms of accuracy and completeness  This is not intended to replace a full medical face-to-face evaluation by the physician  Saige Corado understands and accepts these terms

## 2020-08-07 LAB
ALBUMIN SERPL-MCNC: 4.4 G/DL (ref 3.8–4.8)
ALBUMIN/GLOB SERPL: 2 {RATIO} (ref 1.2–2.2)
ALP SERPL-CCNC: 48 IU/L (ref 39–117)
ALT SERPL-CCNC: 14 IU/L (ref 0–32)
AST SERPL-CCNC: 19 IU/L (ref 0–40)
BASOPHILS # BLD AUTO: 0 X10E3/UL (ref 0–0.2)
BASOPHILS NFR BLD AUTO: 0 %
BILIRUB SERPL-MCNC: 0.3 MG/DL (ref 0–1.2)
BUN SERPL-MCNC: 19 MG/DL (ref 8–27)
BUN/CREAT SERPL: 25 (ref 12–28)
CALCIUM SERPL-MCNC: 9.8 MG/DL (ref 8.7–10.3)
CHLORIDE SERPL-SCNC: 101 MMOL/L (ref 96–106)
CHOLEST SERPL-MCNC: 240 MG/DL (ref 100–199)
CO2 SERPL-SCNC: 25 MMOL/L (ref 20–29)
CREAT SERPL-MCNC: 0.77 MG/DL (ref 0.57–1)
EOSINOPHIL # BLD AUTO: 0.2 X10E3/UL (ref 0–0.4)
EOSINOPHIL NFR BLD AUTO: 2 %
ERYTHROCYTE [DISTWIDTH] IN BLOOD BY AUTOMATED COUNT: 13.3 % (ref 11.7–15.4)
GLOBULIN SER-MCNC: 2.2 G/DL (ref 1.5–4.5)
GLUCOSE SERPL-MCNC: 91 MG/DL (ref 65–99)
HCT VFR BLD AUTO: 38.8 % (ref 34–46.6)
HCV AB S/CO SERPL IA: <0.1 S/CO RATIO (ref 0–0.9)
HDLC SERPL-MCNC: 95 MG/DL
HGB BLD-MCNC: 13.2 G/DL (ref 11.1–15.9)
HIV 1+2 AB+HIV1 P24 AG SERPL QL IA: NON REACTIVE
IMM GRANULOCYTES # BLD: 0 X10E3/UL (ref 0–0.1)
IMM GRANULOCYTES NFR BLD: 0 %
LDLC SERPL CALC-MCNC: 131 MG/DL (ref 0–99)
LYMPHOCYTES # BLD AUTO: 3.4 X10E3/UL (ref 0.7–3.1)
LYMPHOCYTES NFR BLD AUTO: 42 %
MCH RBC QN AUTO: 30.4 PG (ref 26.6–33)
MCHC RBC AUTO-ENTMCNC: 34 G/DL (ref 31.5–35.7)
MCV RBC AUTO: 89 FL (ref 79–97)
MICRODELETION SYND BLD/T FISH: NORMAL
MONOCYTES # BLD AUTO: 0.6 X10E3/UL (ref 0.1–0.9)
MONOCYTES NFR BLD AUTO: 8 %
NEUTROPHILS # BLD AUTO: 3.9 X10E3/UL (ref 1.4–7)
NEUTROPHILS NFR BLD AUTO: 48 %
PLATELET # BLD AUTO: 295 X10E3/UL (ref 150–450)
POTASSIUM SERPL-SCNC: 4.6 MMOL/L (ref 3.5–5.2)
PROT SERPL-MCNC: 6.6 G/DL (ref 6–8.5)
RBC # BLD AUTO: 4.34 X10E6/UL (ref 3.77–5.28)
SL AMB EGFR AFRICAN AMERICAN: 96 ML/MIN/1.73
SL AMB EGFR NON AFRICAN AMERICAN: 84 ML/MIN/1.73
SL AMB VLDL CHOLESTEROL CALC: 14 MG/DL (ref 5–40)
SODIUM SERPL-SCNC: 139 MMOL/L (ref 134–144)
TRIGL SERPL-MCNC: 72 MG/DL (ref 0–149)
TSH SERPL DL<=0.005 MIU/L-ACNC: 3.98 UIU/ML (ref 0.45–4.5)
WBC # BLD AUTO: 8.2 X10E3/UL (ref 3.4–10.8)

## 2020-08-19 ENCOUNTER — TELEPHONE (OUTPATIENT)
Dept: FAMILY MEDICINE CLINIC | Facility: CLINIC | Age: 60
End: 2020-08-19

## 2020-08-19 NOTE — TELEPHONE ENCOUNTER
I s/w pt and relayed the mychart explanation in the chart because she states he does not use the portal, though it appears active  She states she will try to contact Dr Ariela Lewis on her return about any further investigation

## 2020-08-19 NOTE — TELEPHONE ENCOUNTER
Looking for blood work results  Can we have another doctor look these over since Dr Remington Badillo is not in this week  Patient would like to know results sooner than later       Thank You

## 2020-10-16 ENCOUNTER — OFFICE VISIT (OUTPATIENT)
Dept: FAMILY MEDICINE CLINIC | Facility: CLINIC | Age: 60
End: 2020-10-16
Payer: OTHER GOVERNMENT

## 2020-10-16 VITALS
SYSTOLIC BLOOD PRESSURE: 124 MMHG | WEIGHT: 121 LBS | TEMPERATURE: 97.6 F | HEART RATE: 68 BPM | RESPIRATION RATE: 16 BRPM | DIASTOLIC BLOOD PRESSURE: 80 MMHG | BODY MASS INDEX: 21.44 KG/M2 | HEIGHT: 63 IN

## 2020-10-16 DIAGNOSIS — Z79.899 ENCOUNTER FOR LONG-TERM CURRENT USE OF MEDICATION: ICD-10-CM

## 2020-10-16 DIAGNOSIS — Z12.31 ENCOUNTER FOR SCREENING MAMMOGRAM FOR BREAST CANCER: ICD-10-CM

## 2020-10-16 DIAGNOSIS — Z00.00 ANNUAL PHYSICAL EXAM: Primary | ICD-10-CM

## 2020-10-16 PROCEDURE — 99396 PREV VISIT EST AGE 40-64: CPT | Performed by: FAMILY MEDICINE

## 2020-10-28 ENCOUNTER — APPOINTMENT (OUTPATIENT)
Dept: RADIOLOGY | Facility: CLINIC | Age: 60
End: 2020-10-28
Payer: OTHER GOVERNMENT

## 2020-10-28 ENCOUNTER — OFFICE VISIT (OUTPATIENT)
Dept: OBGYN CLINIC | Facility: CLINIC | Age: 60
End: 2020-10-28
Payer: OTHER GOVERNMENT

## 2020-10-28 VITALS
DIASTOLIC BLOOD PRESSURE: 80 MMHG | SYSTOLIC BLOOD PRESSURE: 152 MMHG | BODY MASS INDEX: 21.86 KG/M2 | WEIGHT: 123.4 LBS | HEIGHT: 63 IN | HEART RATE: 63 BPM | TEMPERATURE: 97.4 F

## 2020-10-28 DIAGNOSIS — M25.372 LEFT ANKLE INSTABILITY: ICD-10-CM

## 2020-10-28 DIAGNOSIS — M77.42 METATARSALGIA OF LEFT FOOT: Primary | ICD-10-CM

## 2020-10-28 DIAGNOSIS — M79.672 PAIN IN LEFT FOOT: ICD-10-CM

## 2020-10-28 PROCEDURE — 99204 OFFICE O/P NEW MOD 45 MIN: CPT | Performed by: ORTHOPAEDIC SURGERY

## 2020-10-28 PROCEDURE — 73630 X-RAY EXAM OF FOOT: CPT

## 2020-11-12 ENCOUNTER — TELEPHONE (OUTPATIENT)
Dept: FAMILY MEDICINE CLINIC | Facility: CLINIC | Age: 60
End: 2020-11-12

## 2020-11-12 DIAGNOSIS — Z11.9 ENCOUNTER FOR SCREENING FOR INFECTIOUS AND PARASITIC DISEASES, UNSPECIFIED: Primary | ICD-10-CM

## 2020-11-13 DIAGNOSIS — Z11.9 ENCOUNTER FOR SCREENING FOR INFECTIOUS AND PARASITIC DISEASES, UNSPECIFIED: ICD-10-CM

## 2020-11-13 PROCEDURE — U0003 INFECTIOUS AGENT DETECTION BY NUCLEIC ACID (DNA OR RNA); SEVERE ACUTE RESPIRATORY SYNDROME CORONAVIRUS 2 (SARS-COV-2) (CORONAVIRUS DISEASE [COVID-19]), AMPLIFIED PROBE TECHNIQUE, MAKING USE OF HIGH THROUGHPUT TECHNOLOGIES AS DESCRIBED BY CMS-2020-01-R: HCPCS | Performed by: FAMILY MEDICINE

## 2020-11-15 LAB — SARS-COV-2 RNA SPEC QL NAA+PROBE: NOT DETECTED

## 2020-11-16 ENCOUNTER — TELEPHONE (OUTPATIENT)
Dept: FAMILY MEDICINE CLINIC | Facility: CLINIC | Age: 60
End: 2020-11-16

## 2020-11-17 LAB
APPEARANCE UR: CLEAR
BACTERIA URNS QL MICRO: ABNORMAL
BILIRUB UR QL STRIP: NEGATIVE
COLOR UR: YELLOW
EPI CELLS #/AREA URNS HPF: ABNORMAL /HPF (ref 0–10)
GLUCOSE UR QL: NEGATIVE
HGB UR QL STRIP: NEGATIVE
KETONES UR QL STRIP: NEGATIVE
LEUKOCYTE ESTERASE UR QL STRIP: ABNORMAL
MICRO URNS: ABNORMAL
NITRITE UR QL STRIP: POSITIVE
PH UR STRIP: 5.5 [PH] (ref 5–7.5)
PROT UR QL STRIP: NEGATIVE
RBC #/AREA URNS HPF: ABNORMAL /HPF (ref 0–2)
SP GR UR: 1.01 (ref 1–1.03)
UROBILINOGEN UR STRIP-ACNC: 0.2 MG/DL (ref 0.2–1)
WBC #/AREA URNS HPF: ABNORMAL /HPF (ref 0–5)

## 2020-11-30 ENCOUNTER — TELEPHONE (OUTPATIENT)
Dept: FAMILY MEDICINE CLINIC | Facility: CLINIC | Age: 60
End: 2020-11-30

## 2020-11-30 DIAGNOSIS — N39.0 ACUTE UTI: Primary | ICD-10-CM

## 2020-11-30 RX ORDER — SULFAMETHOXAZOLE AND TRIMETHOPRIM 800; 160 MG/1; MG/1
1 TABLET ORAL EVERY 12 HOURS SCHEDULED
Qty: 14 TABLET | Refills: 0 | Status: SHIPPED | OUTPATIENT
Start: 2020-11-30 | End: 2020-12-07

## 2021-02-19 ENCOUNTER — TELEPHONE (OUTPATIENT)
Dept: FAMILY MEDICINE CLINIC | Facility: CLINIC | Age: 61
End: 2021-02-19

## 2021-02-19 ENCOUNTER — TELEPHONE (OUTPATIENT)
Dept: OBGYN CLINIC | Facility: CLINIC | Age: 61
End: 2021-02-19

## 2021-02-19 NOTE — TELEPHONE ENCOUNTER
Patient called today and stated she normally see's Dr Patricia Huggins and that she is postmenopausal and just recently had bleeding after intercourse  She is worried about this, due to the cancer history in her family  I scheduled her an appointment for 2/25/21, she would like Dr Patricia Huggins to let her know if she should be seen earlier or if it's ok for her to wait til her appointment

## 2021-02-19 NOTE — TELEPHONE ENCOUNTER
DR SHELL     Patient is having some vaginal spotting  She is asking if you could recommend a Dr that specializes in post menopausal women    Please call back

## 2021-02-25 ENCOUNTER — OFFICE VISIT (OUTPATIENT)
Dept: OBGYN CLINIC | Facility: CLINIC | Age: 61
End: 2021-02-25
Payer: OTHER GOVERNMENT

## 2021-02-25 VITALS — BODY MASS INDEX: 21.97 KG/M2 | DIASTOLIC BLOOD PRESSURE: 72 MMHG | SYSTOLIC BLOOD PRESSURE: 120 MMHG | WEIGHT: 124 LBS

## 2021-02-25 DIAGNOSIS — N95.0 POSTMENOPAUSAL BLEEDING: Primary | ICD-10-CM

## 2021-02-25 PROCEDURE — 88305 TISSUE EXAM BY PATHOLOGIST: CPT | Performed by: PATHOLOGY

## 2021-02-25 PROCEDURE — 99213 OFFICE O/P EST LOW 20 MIN: CPT | Performed by: OBSTETRICS & GYNECOLOGY

## 2021-02-25 PROCEDURE — 58100 BIOPSY OF UTERUS LINING: CPT | Performed by: OBSTETRICS & GYNECOLOGY

## 2021-02-25 NOTE — PROGRESS NOTES
Endometrial biopsy    Date/Time: 2/25/2021 3:27 PM  Performed by: Israel Qureshi MD  Authorized by: Israel Qureshi MD   Universal Protocol:  Consent: Verbal consent obtained  Risks and benefits: risks, benefits and alternatives were discussed  Consent given by: patient      Indication:     Indications: Post-menopausal bleeding      Chronicity of post-menopausal bleeding:  New  Pre-procedure:     Premeds:  Ibuprofen  Procedure:     Procedure: endometrial biopsy with Pipelle      A bivalve speculum was placed in the vagina: yes      Cervix cleaned and prepped: yes      Uterus sounded: yes      Uterus sound depth (cm):  6    Specimen collected: specimen collected and sent to pathology      Patient tolerated procedure well with no complications: yes    Findings:     Cervix: normal        Patient presents with two episodes of postmenopausal bleeding, once a week ago after intercourse and then a few days later  Both episodes were bright red bleeding and a decent amount but resolved the next day  She denies other episodes of bleeding  Possible causes of PMB and work-up discussed  All questions answered  Will call with results of biopsy as well as ultrasound to discuss future management if needed

## 2021-03-02 ENCOUNTER — HOSPITAL ENCOUNTER (OUTPATIENT)
Dept: RADIOLOGY | Facility: HOSPITAL | Age: 61
Discharge: HOME | End: 2021-03-02
Payer: OTHER GOVERNMENT

## 2021-03-02 DIAGNOSIS — N95.0 POSTMENOPAUSAL BLEEDING: ICD-10-CM

## 2021-03-02 PROCEDURE — 76830 TRANSVAGINAL US NON-OB: CPT

## 2021-03-02 PROCEDURE — 76856 US EXAM PELVIC COMPLETE: CPT

## 2021-03-04 ENCOUNTER — TELEPHONE (OUTPATIENT)
Dept: OBGYN CLINIC | Facility: CLINIC | Age: 61
End: 2021-03-04

## 2021-03-04 NOTE — TELEPHONE ENCOUNTER
Patient having trouble sleeping  She wants to know if you have any suggestions for her  Went for ultrasound and a little anxious for results  I called to get it read today so if you can look out for it and let triage know since I'm not in tomorrow

## 2021-03-08 ENCOUNTER — TELEPHONE (OUTPATIENT)
Dept: OBGYN CLINIC | Facility: CLINIC | Age: 61
End: 2021-03-08

## 2021-03-08 NOTE — TELEPHONE ENCOUNTER
I sent results to Fabiana Montes De Oca for her to call the patient  Is charity in the office today?

## 2021-03-09 NOTE — TELEPHONE ENCOUNTER
Spoke with Beatrice Keating about her results  Will possibly need D&C polypectomy and will speak with Dr Vic Leyva about a date and time for patient

## 2021-03-12 ENCOUNTER — HOSPITAL ENCOUNTER (OUTPATIENT)
Dept: RADIOLOGY | Facility: HOSPITAL | Age: 61
Discharge: HOME | End: 2021-03-12
Payer: OTHER GOVERNMENT

## 2021-03-12 ENCOUNTER — TELEPHONE (OUTPATIENT)
Dept: RADIOLOGY | Facility: HOSPITAL | Age: 61
End: 2021-03-12

## 2021-03-12 VITALS — HEIGHT: 63 IN | WEIGHT: 122 LBS | BODY MASS INDEX: 21.62 KG/M2

## 2021-03-12 DIAGNOSIS — Z12.31 ENCOUNTER FOR SCREENING MAMMOGRAM FOR BREAST CANCER: ICD-10-CM

## 2021-03-12 PROCEDURE — 77067 SCR MAMMO BI INCL CAD: CPT

## 2021-03-12 PROCEDURE — 77063 BREAST TOMOSYNTHESIS BI: CPT

## 2021-03-17 ENCOUNTER — OFFICE VISIT (OUTPATIENT)
Dept: OBGYN CLINIC | Facility: CLINIC | Age: 61
End: 2021-03-17
Payer: OTHER GOVERNMENT

## 2021-03-17 VITALS
HEIGHT: 63 IN | WEIGHT: 123.2 LBS | SYSTOLIC BLOOD PRESSURE: 144 MMHG | BODY MASS INDEX: 21.83 KG/M2 | DIASTOLIC BLOOD PRESSURE: 82 MMHG

## 2021-03-17 DIAGNOSIS — R93.89 ENDOMETRIAL THICKENING ON ULTRASOUND: ICD-10-CM

## 2021-03-17 DIAGNOSIS — N95.0 POSTMENOPAUSAL BLEEDING: Primary | ICD-10-CM

## 2021-03-17 PROCEDURE — 99214 OFFICE O/P EST MOD 30 MIN: CPT | Performed by: OBSTETRICS & GYNECOLOGY

## 2021-03-17 NOTE — PROGRESS NOTES
A/P: Patient is 63 y o  yo female with postmenopausal bleeding and a thickened focus in her endometrium suspicious for endometrial polyp  For hysteroscopy, D+C, possible polypectomy in the OR or office  Surgical coordinator to preauthorize and schedule  S: Pt is 63 y o  yo female with a chief complaint of postmenopausal bleeding  She had an ultrasound which showed a 8mm nodule in her endometrium  She had an endometrial biopsy which showed benign tissue  She was therefore counseled to undergo a hysteroscopy, D+C, possible polypectomy given this focal finding on ultrasound that could have been missed on endometrial biopsy with pipelle  She is a candidate for surgery in the office secondary to her overall good health, but we will try to schedule the surgery at the first available site  (She is going to [facility] on vacation in early May )    We discussed the intra operative procedure as well as the postoperative expectations  We discussed the risks of surgery including but not limited to bleeding, transfusion, infection, uterine perforation, injury to surrounding organs, need for laparoscopy  She is aware that we will have the biopsy results reviewed in pathology and that this will be used to guide further management  All of her questions were answered, and consents were signed  O:   Vitals:    03/17/21 1715   BP: 144/82     She appears well and in no distress  Pelvic exam from 2/25/2021:  External genitals are normal  Vagina is normal  Cervix, uterus and adnexa are nontender, no masses palpable

## 2021-03-23 ENCOUNTER — OFFICE VISIT (OUTPATIENT)
Dept: FAMILY MEDICINE CLINIC | Facility: CLINIC | Age: 61
End: 2021-03-23
Payer: OTHER GOVERNMENT

## 2021-03-23 VITALS
WEIGHT: 122 LBS | RESPIRATION RATE: 16 BRPM | HEIGHT: 63 IN | HEART RATE: 68 BPM | SYSTOLIC BLOOD PRESSURE: 114 MMHG | TEMPERATURE: 99.2 F | DIASTOLIC BLOOD PRESSURE: 70 MMHG | BODY MASS INDEX: 21.62 KG/M2

## 2021-03-23 DIAGNOSIS — M25.562 POSTERIOR LEFT KNEE PAIN: Primary | ICD-10-CM

## 2021-03-23 DIAGNOSIS — I83.93 ASYMPTOMATIC VARICOSE VEINS OF BOTH LOWER EXTREMITIES: ICD-10-CM

## 2021-03-23 DIAGNOSIS — G47.00 INSOMNIA, UNSPECIFIED TYPE: ICD-10-CM

## 2021-03-23 PROCEDURE — 99213 OFFICE O/P EST LOW 20 MIN: CPT | Performed by: NURSE PRACTITIONER

## 2021-03-23 NOTE — PROGRESS NOTES
Assessment/Plan:    Will check US to evaluated for baker's cyst and f/u with results  1  Posterior left knee pain  -     VAS lower limb venous duplex study, unilateral/limited; Future; Expected date: 03/23/2021    2  Asymptomatic varicose veins of both lower extremities    3  Insomnia, unspecified type  Comments:  She has tried Melatonin  Reviewed types of medications prescribed for insomnia  She would like to try something OTC first            There are no Patient Instructions on file for this visit  Return if symptoms worsen or fail to improve  Subjective:      Patient ID: Gevena Cheadle is a 63 y o  female  Chief Complaint   Patient presents with    Leg Pain     Left lower leg pain for a few months, with no known trauma  Also, not sleeping well [facility]       Here today with complaints of pain behind her left knee for the past several months  Notices pain when laying down at night and also with activity  Pain is described as a throbbing sensation  She denies any leg swelling, redness, or warmth  Has been worsening over the past few weeks  No specific knee pain or known injury  Also with complaints of difficulty sleeping  Takes a long time to fall asleep and has trouble staying asleep  She has tried Melatonin and had vivid dreams with this  No specific stress or anxiety triggers  The following portions of the patient's history were reviewed and updated as appropriate: allergies, current medications, past family history, past medical history, past social history, past surgical history and problem list     Review of Systems   Constitutional: Negative  Respiratory: Negative  Gastrointestinal: Negative  Musculoskeletal:        See HPI   Neurological: Negative            Current Outpatient Medications   Medication Sig Dispense Refill    RESTASIS MULTIDOSE 0 05 % ophthalmic emulsion Administer 1 drop to both eyes PRN       No current facility-administered medications for this visit  Objective:    /70   Pulse 68   Temp 99 2 °F (37 3 °C)   Resp 16   Ht 5' 2 75" (1 594 m)   Wt 55 3 kg (122 lb)   BMI 21 78 kg/m²        Physical Exam  Vitals signs and nursing note reviewed  Constitutional:       Appearance: She is well-developed  Cardiovascular:      Rate and Rhythm: Normal rate and regular rhythm  Heart sounds: Normal heart sounds  No murmur  Pulmonary:      Effort: Pulmonary effort is normal       Breath sounds: Normal breath sounds  Musculoskeletal:      Left knee: She exhibits normal range of motion, no swelling and no effusion  Tenderness (posterior) found  Skin:     General: Skin is warm and dry  Neurological:      Mental Status: She is alert     Psychiatric:         Mood and Affect: Mood normal          Behavior: Behavior normal                 PATI Navas

## 2021-03-30 DIAGNOSIS — Z23 ENCOUNTER FOR IMMUNIZATION: ICD-10-CM

## 2021-04-01 ENCOUNTER — IMMUNIZATIONS (OUTPATIENT)
Dept: FAMILY MEDICINE CLINIC | Facility: HOSPITAL | Age: 61
End: 2021-04-01
Payer: OTHER GOVERNMENT

## 2021-04-01 ENCOUNTER — HOSPITAL ENCOUNTER (OUTPATIENT)
Dept: RADIOLOGY | Facility: HOSPITAL | Age: 61
Discharge: HOME | End: 2021-04-01
Payer: OTHER GOVERNMENT

## 2021-04-01 DIAGNOSIS — Z23 ENCOUNTER FOR IMMUNIZATION: Primary | ICD-10-CM

## 2021-04-01 DIAGNOSIS — M25.562 POSTERIOR LEFT KNEE PAIN: ICD-10-CM

## 2021-04-01 PROCEDURE — 93971 EXTREMITY STUDY: CPT | Performed by: SURGERY

## 2021-04-01 PROCEDURE — 0011A SARS-COV-2 / COVID-19 MRNA VACCINE (MODERNA) 100 MCG: CPT

## 2021-04-01 PROCEDURE — 93971 EXTREMITY STUDY: CPT

## 2021-04-01 PROCEDURE — 91301 SARS-COV-2 / COVID-19 MRNA VACCINE (MODERNA) 100 MCG: CPT

## 2021-04-10 ENCOUNTER — LAB (OUTPATIENT)
Dept: LAB | Facility: HOSPITAL | Age: 61
End: 2021-04-10
Payer: OTHER GOVERNMENT

## 2021-04-10 ENCOUNTER — APPOINTMENT (OUTPATIENT)
Dept: LAB | Facility: HOSPITAL | Age: 61
End: 2021-04-10
Payer: OTHER GOVERNMENT

## 2021-04-10 DIAGNOSIS — Z01.818 PRE-OP TESTING: ICD-10-CM

## 2021-04-10 LAB
ANION GAP SERPL CALCULATED.3IONS-SCNC: 6 MMOL/L (ref 4–13)
BUN SERPL-MCNC: 20 MG/DL (ref 5–25)
CALCIUM SERPL-MCNC: 8.9 MG/DL (ref 8.3–10.1)
CHLORIDE SERPL-SCNC: 104 MMOL/L (ref 100–108)
CO2 SERPL-SCNC: 32 MMOL/L (ref 21–32)
CREAT SERPL-MCNC: 0.73 MG/DL (ref 0.6–1.3)
ERYTHROCYTE [DISTWIDTH] IN BLOOD BY AUTOMATED COUNT: 13.3 % (ref 11.6–15.1)
GFR SERPL CREATININE-BSD FRML MDRD: 89 ML/MIN/1.73SQ M
GLUCOSE P FAST SERPL-MCNC: 110 MG/DL (ref 65–99)
HCT VFR BLD AUTO: 40.8 % (ref 34.8–46.1)
HGB BLD-MCNC: 12.8 G/DL (ref 11.5–15.4)
MCH RBC QN AUTO: 29.8 PG (ref 26.8–34.3)
MCHC RBC AUTO-ENTMCNC: 31.4 G/DL (ref 31.4–37.4)
MCV RBC AUTO: 95 FL (ref 82–98)
PLATELET # BLD AUTO: 313 THOUSANDS/UL (ref 149–390)
PMV BLD AUTO: 11 FL (ref 8.9–12.7)
POTASSIUM SERPL-SCNC: 4.4 MMOL/L (ref 3.5–5.3)
RBC # BLD AUTO: 4.3 MILLION/UL (ref 3.81–5.12)
SODIUM SERPL-SCNC: 142 MMOL/L (ref 136–145)
WBC # BLD AUTO: 8.06 THOUSAND/UL (ref 4.31–10.16)

## 2021-04-10 PROCEDURE — 36415 COLL VENOUS BLD VENIPUNCTURE: CPT

## 2021-04-10 PROCEDURE — 85027 COMPLETE CBC AUTOMATED: CPT

## 2021-04-10 PROCEDURE — 93005 ELECTROCARDIOGRAM TRACING: CPT

## 2021-04-10 PROCEDURE — 80048 BASIC METABOLIC PNL TOTAL CA: CPT

## 2021-04-13 LAB
ATRIAL RATE: 71 BPM
P AXIS: 74 DEGREES
PR INTERVAL: 160 MS
QRS AXIS: 72 DEGREES
QRSD INTERVAL: 68 MS
QT INTERVAL: 368 MS
QTC INTERVAL: 399 MS
T WAVE AXIS: 67 DEGREES
VENTRICULAR RATE: 71 BPM

## 2021-04-13 PROCEDURE — 93010 ELECTROCARDIOGRAM REPORT: CPT | Performed by: INTERNAL MEDICINE

## 2021-04-13 RX ORDER — MULTIVITAMIN
1 TABLET ORAL DAILY
COMMUNITY
End: 2021-07-13 | Stop reason: ALTCHOICE

## 2021-04-13 RX ORDER — DIPHENHYDRAMINE HCL 25 MG
25 TABLET ORAL EVERY 6 HOURS PRN
COMMUNITY
End: 2021-06-24

## 2021-04-13 NOTE — PRE-PROCEDURE INSTRUCTIONS
Pre-Surgery Instructions:   Medication Instructions    diphenhydrAMINE (BENADRYL) 25 mg tablet pt instructed to not take on day of surgery    Multiple Vitamin (multivitamin) tablet pt instructed to stop prior to surgery    RESTASIS MULTIDOSE 0 05 % ophthalmic emulsion pt can use on day of surgery prn     Pt denies fever, sob, sore throat and cough  Pt verbalized understanding of shower and [place] visitor instructions  Pt instructed to stop nsaids and supplements prior to surgery

## 2021-04-15 ENCOUNTER — ANESTHESIA EVENT (OUTPATIENT)
Dept: PERIOP | Facility: HOSPITAL | Age: 61
End: 2021-04-15
Payer: OTHER GOVERNMENT

## 2021-04-15 NOTE — ANESTHESIA PREPROCEDURE EVALUATION
Procedure:  DILATATION AND CURETTAGE (D&C) WITH HYSTEROSCOPY (N/A Uterus)  POSSIBLE POLYPECTOMY (N/A Uterus)    Relevant Problems   Other   (+) Thyroid nodule     Pneumonia recent patient taking anitbiotics now          Physical Exam    Airway    Mallampati score: II  TM Distance: >3 FB  Neck ROM: full     Dental       Cardiovascular      Pulmonary      Other Findings        Anesthesia Plan  ASA Score- 2     Anesthesia Type- general with ASA Monitors  Additional Monitors:   Airway Plan: LMA  Plan Factors-    Chart reviewed  Induction- intravenous  Postoperative Plan-     Informed Consent- Anesthetic plan and risks discussed with patient  I personally reviewed this patient with the CRNA  Discussed and agreed on the Anesthesia Plan with the CRNA  Kurt Hurtado

## 2021-04-16 ENCOUNTER — ANESTHESIA (OUTPATIENT)
Dept: PERIOP | Facility: HOSPITAL | Age: 61
End: 2021-04-16
Payer: OTHER GOVERNMENT

## 2021-04-16 ENCOUNTER — HOSPITAL ENCOUNTER (OUTPATIENT)
Facility: HOSPITAL | Age: 61
Setting detail: OUTPATIENT SURGERY
Discharge: HOME | End: 2021-04-16
Attending: OBSTETRICS & GYNECOLOGY | Admitting: OBSTETRICS & GYNECOLOGY
Payer: OTHER GOVERNMENT

## 2021-04-16 VITALS
TEMPERATURE: 97.2 F | SYSTOLIC BLOOD PRESSURE: 134 MMHG | HEIGHT: 63 IN | HEART RATE: 48 BPM | OXYGEN SATURATION: 100 % | BODY MASS INDEX: 21.26 KG/M2 | RESPIRATION RATE: 18 BRPM | DIASTOLIC BLOOD PRESSURE: 82 MMHG | WEIGHT: 120 LBS

## 2021-04-16 DIAGNOSIS — R93.89 THICKENED ENDOMETRIUM: ICD-10-CM

## 2021-04-16 DIAGNOSIS — N95.0 PMB (POSTMENOPAUSAL BLEEDING): ICD-10-CM

## 2021-04-16 PROCEDURE — 88305 TISSUE EXAM BY PATHOLOGIST: CPT | Performed by: PATHOLOGY

## 2021-04-16 PROCEDURE — 99024 POSTOP FOLLOW-UP VISIT: CPT | Performed by: OBSTETRICS & GYNECOLOGY

## 2021-04-16 PROCEDURE — 58558 HYSTEROSCOPY BIOPSY: CPT | Performed by: OBSTETRICS & GYNECOLOGY

## 2021-04-16 RX ORDER — MAGNESIUM HYDROXIDE 1200 MG/15ML
LIQUID ORAL AS NEEDED
Status: DISCONTINUED | OUTPATIENT
Start: 2021-04-16 | End: 2021-04-16 | Stop reason: HOSPADM

## 2021-04-16 RX ORDER — LIDOCAINE HYDROCHLORIDE 10 MG/ML
INJECTION, SOLUTION EPIDURAL; INFILTRATION; INTRACAUDAL; PERINEURAL AS NEEDED
Status: DISCONTINUED | OUTPATIENT
Start: 2021-04-16 | End: 2021-04-16

## 2021-04-16 RX ORDER — ONDANSETRON 2 MG/ML
INJECTION INTRAMUSCULAR; INTRAVENOUS AS NEEDED
Status: DISCONTINUED | OUTPATIENT
Start: 2021-04-16 | End: 2021-04-16

## 2021-04-16 RX ORDER — FENTANYL CITRATE/PF 50 MCG/ML
50 SYRINGE (ML) INJECTION
Status: DISCONTINUED | OUTPATIENT
Start: 2021-04-16 | End: 2021-04-16 | Stop reason: HOSPADM

## 2021-04-16 RX ORDER — DEXAMETHASONE SODIUM PHOSPHATE 10 MG/ML
INJECTION, SOLUTION INTRAMUSCULAR; INTRAVENOUS AS NEEDED
Status: DISCONTINUED | OUTPATIENT
Start: 2021-04-16 | End: 2021-04-16

## 2021-04-16 RX ORDER — PROPOFOL 10 MG/ML
INJECTION, EMULSION INTRAVENOUS AS NEEDED
Status: DISCONTINUED | OUTPATIENT
Start: 2021-04-16 | End: 2021-04-16

## 2021-04-16 RX ORDER — METOCLOPRAMIDE HYDROCHLORIDE 5 MG/ML
10 INJECTION INTRAMUSCULAR; INTRAVENOUS ONCE AS NEEDED
Status: DISCONTINUED | OUTPATIENT
Start: 2021-04-16 | End: 2021-04-16 | Stop reason: HOSPADM

## 2021-04-16 RX ORDER — ONDANSETRON 2 MG/ML
4 INJECTION INTRAMUSCULAR; INTRAVENOUS ONCE AS NEEDED
Status: DISCONTINUED | OUTPATIENT
Start: 2021-04-16 | End: 2021-04-16 | Stop reason: HOSPADM

## 2021-04-16 RX ORDER — FENTANYL CITRATE 50 UG/ML
INJECTION, SOLUTION INTRAMUSCULAR; INTRAVENOUS AS NEEDED
Status: DISCONTINUED | OUTPATIENT
Start: 2021-04-16 | End: 2021-04-16

## 2021-04-16 RX ORDER — ACETAMINOPHEN 325 MG/1
650 TABLET ORAL EVERY 6 HOURS PRN
Status: DISCONTINUED | OUTPATIENT
Start: 2021-04-16 | End: 2021-04-16 | Stop reason: HOSPADM

## 2021-04-16 RX ORDER — SODIUM CHLORIDE, SODIUM LACTATE, POTASSIUM CHLORIDE, CALCIUM CHLORIDE 600; 310; 30; 20 MG/100ML; MG/100ML; MG/100ML; MG/100ML
50 INJECTION, SOLUTION INTRAVENOUS CONTINUOUS
Status: DISCONTINUED | OUTPATIENT
Start: 2021-04-16 | End: 2021-04-16 | Stop reason: HOSPADM

## 2021-04-16 RX ORDER — GLYCOPYRROLATE 0.2 MG/ML
INJECTION INTRAMUSCULAR; INTRAVENOUS AS NEEDED
Status: DISCONTINUED | OUTPATIENT
Start: 2021-04-16 | End: 2021-04-16

## 2021-04-16 RX ADMIN — FENTANYL CITRATE 25 MCG: 50 INJECTION INTRAMUSCULAR; INTRAVENOUS at 08:38

## 2021-04-16 RX ADMIN — GLYCOPYRROLATE 0.1 MG: 0.2 INJECTION, SOLUTION INTRAMUSCULAR; INTRAVENOUS at 08:27

## 2021-04-16 RX ADMIN — SODIUM CHLORIDE, SODIUM LACTATE, POTASSIUM CHLORIDE, AND CALCIUM CHLORIDE 50 ML/HR: .6; .31; .03; .02 INJECTION, SOLUTION INTRAVENOUS at 07:45

## 2021-04-16 RX ADMIN — DEXAMETHASONE SODIUM PHOSPHATE 5 MG: 10 INJECTION, SOLUTION INTRAMUSCULAR; INTRAVENOUS at 08:27

## 2021-04-16 RX ADMIN — ONDANSETRON 4 MG: 2 INJECTION INTRAMUSCULAR; INTRAVENOUS at 08:27

## 2021-04-16 RX ADMIN — LIDOCAINE HYDROCHLORIDE 60 MG: 10 INJECTION, SOLUTION EPIDURAL; INFILTRATION; INTRACAUDAL; PERINEURAL at 08:11

## 2021-04-16 RX ADMIN — Medication 50 MCG: at 09:16

## 2021-04-16 RX ADMIN — FENTANYL CITRATE 25 MCG: 50 INJECTION INTRAMUSCULAR; INTRAVENOUS at 08:16

## 2021-04-16 RX ADMIN — PROPOFOL 120 MG: 10 INJECTION, EMULSION INTRAVENOUS at 08:11

## 2021-04-16 RX ADMIN — PROPOFOL 50 MG: 10 INJECTION, EMULSION INTRAVENOUS at 08:12

## 2021-04-16 RX ADMIN — SODIUM CHLORIDE, SODIUM LACTATE, POTASSIUM CHLORIDE, AND CALCIUM CHLORIDE: .6; .31; .03; .02 INJECTION, SOLUTION INTRAVENOUS at 08:49

## 2021-04-16 NOTE — ANESTHESIA POSTPROCEDURE EVALUATION
Post-Op Assessment Note    CV Status:  Stable  Pain Score: 0    Pain management: adequate     Mental Status:  Alert and awake   Hydration Status:  Euvolemic   PONV Controlled:  Controlled   Airway Patency:  Patent      Post Op Vitals Reviewed: Yes      Staff: CRNA         No complications documented      /79 (04/16/21 0859)    Temp 97 5 °F (36 4 °C) (04/16/21 0859)    Pulse 71 (04/16/21 0859)   Resp 14 (04/16/21 0859)    SpO2 99 % (04/16/21 0859)

## 2021-04-16 NOTE — H&P
H&P Exam - Gynecology   Noble Malave 57 y o  female MRN: [id number]  Unit/Bed#: [facility] [place] Encounter: [id number]    No chief complaint on file  History of Present Illness     HPI:  Noble Malave is a 57 y o  female who presents with an episode of postmenopausal bleeding  An endometrial biopsy was benign, but an ultrasound suggested a focal finding consistent with polyp  Review of Systems    Historical Information   Past Medical History:   Diagnosis Date    Acute UTI     recently treated    Cataract     bilateral    Endometriosis     Foot pain     Pneumonia     recent patient taking anitbiotics now     Past Surgical History:   Procedure Laterality Date    CATARACT EXTRACTION Bilateral     COLONOSCOPY      Complete    DIAGNOSTIC LAPAROSCOPY      for fertility;  Per Allscripts: For infertility, endometriosis    NECK SURGERY      closure of hole in front of neck    OTHER SURGICAL HISTORY      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, Infertility surgery    MS XCAPSL CTRC RMVL INSJ IO LENS PROSTH W/O ECP Left 3/7/2016    Procedure: EXTRACTION EXTRACAPSULAR CATARACT PHACO INTRAOCULAR LENS (IOL); Surgeon: Jg Srivastava MD;  Location: [facility] MAIN OR;  Service: Ophthalmology    MS XCAPSL CTRC RMVL INSJ IO LENS PROSTH W/O ECP Right 2/8/2016    Procedure: EXTRACTION EXTRACAPSULAR CATARACT PHACO INTRAOCULAR LENS (IOL);   Surgeon: Jg Srivastava MD;  Location: [facility] MAIN OR;  Service: Ophthalmology     OB/GYN History:    Family History   Problem Relation Age of Onset    Cancer Mother 43        breast    Arthritis Mother     Other Mother         Cardiac disorder    Osteoporosis Mother     Skin cancer Mother     Breast cancer Mother 43    BRCA1 Negative Mother     BRCA2 Negative Mother     Cancer Sister         breast    Breast cancer Sister 39    Cancer Brother         neck    Diabetes Father     Arthritis Maternal Grandmother     Ovarian cancer Maternal Grandmother 79    Hypertension Maternal Grandfather     Other Paternal Grandfather         Cardiac disorder    Diabetes Other         Paternal Relatives     Social History   Social History     Substance and Sexual Activity   Alcohol Use Yes    Alcohol/week: 7 0 standard drinks    Types: 7 Glasses of wine per week    Frequency: 2-3 times a week    Drinks per session: 1 or 2    Binge frequency: Never     Social History     Substance and Sexual Activity   Drug Use No     Social History     Tobacco Use   Smoking Status Never Smoker   Smokeless Tobacco Never Used       Meds/Allergies   Medications Prior to Admission   Medication    diphenhydrAMINE (BENADRYL) 25 mg tablet    Multiple Vitamin (multivitamin) tablet    RESTASIS MULTIDOSE 0 05 % ophthalmic emulsion     Allergies   Allergen Reactions    Penicillin V Rash       Objective   Vitals: Blood pressure 124/81, pulse 70, temperature (!) 97 °F (36 1 °C), temperature source Tympanic, resp  rate 18, height 5' 3" (1 6 m), weight 54 4 kg (120 lb), SpO2 95 %  No intake or output data in the 24 hours ending 04/16/21 0803    Invasive Devices: Invasive Devices     Peripheral Intravenous Line            Peripheral IV 04/16/21 Right Wrist less than 1 day                Physical Exam  HENT:      Head: Normocephalic and atraumatic  Cardiovascular:      Rate and Rhythm: Normal rate and regular rhythm  Heart sounds: Normal heart sounds  Pulmonary:      Effort: Pulmonary effort is normal       Breath sounds: Normal breath sounds  Lab Results:   No visits with results within 1 Day(s) from this visit     Latest known visit with results is:   Lab on 04/10/2021   Component Date Value    Sodium 04/10/2021 142     Potassium 04/10/2021 4 4     Chloride 04/10/2021 104     CO2 04/10/2021 32     ANION GAP 04/10/2021 6     BUN 04/10/2021 20     Creatinine 04/10/2021 0 73     Glucose, Fasting 04/10/2021 110*    Calcium 04/10/2021 8 9     eGFR 04/10/2021 89     WBC 04/10/2021 8 06     RBC 04/10/2021 4 30     Hemoglobin 04/10/2021 12 8     Hematocrit 04/10/2021 40 8     MCV 04/10/2021 95     MCH 04/10/2021 29 8     MCHC 04/10/2021 31 4     RDW 04/10/2021 13 3     Platelets 46/15/3912 313     MPV 04/10/2021 11 0     Ventricular Rate 04/10/2021 71     Atrial Rate 04/10/2021 71     MA Interval 04/10/2021 160     QRSD Interval 04/10/2021 68     QT Interval 04/10/2021 368     QTC Interval 04/10/2021 399     P Axis 04/10/2021 74     QRS Axis 04/10/2021 72     T Wave Axis 04/10/2021 67       Imaging: I have personally reviewed the relevant images  Assessment/Plan     Assessment:  64yo female with postmenopausal bleeding and ?polyp on ultrasound  Plan:  Hysteroscopy, D+C, possible polypectomy

## 2021-04-16 NOTE — DISCHARGE INSTRUCTIONS
Dilation and Curettage   WHAT YOU NEED TO KNOW:   Dilation and curettage (D&C) is a procedure to remove tissue from the lining of your uterus  DISCHARGE INSTRUCTIONS:   Call 911 for any of the following:   · You have signs of an allergic reaction, such as hives, trouble breathing, or severe swelling  Seek care immediately if:   · You have heavy vaginal bleeding that soaks 1 pad in 1 hour for 2 hours in a row  · You have a fever higher than 100 4°F (38°C)  · You have abdominal cramps for more than 2 days  · Your pain does not get better, even after treatment  Contact your healthcare provider if:   · You have foul-smelling vaginal discharge  · You do not get your monthly period  · You feel depressed or anxious  · You feel very tired and weak  · You have questions or concerns about your condition or care  Medicines: You may need any of the following:      · Take your medicine as directed  Contact your healthcare provider if you think your medicine is not helping or if you have side effects  Tell him or her if you are allergic to any medicine  Keep a list of the medicines, vitamins, and herbs you take  Include the amounts, and when and why you take them  Bring the list or the pill bottles to follow-up visits  Carry your medicine list with you in case of an emergency  Self-care:   · Use sanitary pads if needed  You may have light bleeding for up to 2 weeks  Do not use tampons  Use sanitary pads instead  This will help prevent a vaginal infection  · Rest as needed  Slowly start to do more each day  Return to your daily activities as directed  · Do not have sex for at least 2 weeks after the procedure  This will help prevent an infection  · Use birth control right after your procedure  Your monthly period should start again in 4 to 8 weeks  During this time, you could still ovulate (release an egg)   Use birth control as directed to prevent pregnancy during this time     Follow up with your healthcare provider as directed:  Write down your questions so you remember to ask them during your visits  © Copyright [address] Information is for End User's use only and may not be sold, redistributed or otherwise used for commercial purposes  All illustrations and images included in CareNotes® are the copyrighted property of A D A M , Inc  or [facility] Velma Noel   The above information is an  only  It is not intended as medical advice for individual conditions or treatments  Talk to your doctor, nurse or pharmacist before following any medical regimen to see if it is safe and effective for you

## 2021-04-16 NOTE — OP NOTE
OPERATIVE REPORT  PATIENT NAME: Ana Rosa Quick    :  1960  MRN: [id number]  Pt Location:  OR ROOM 07    SURGERY DATE: 2021    Surgeon(s) and Role:     * Denice Eden MD - Primary     * Harrison Moreno MD - Assisting    Preop Diagnosis:  PMB (postmenopausal bleeding) [N95 0]  Thickened endometrium [R93 89]    Post-Op Diagnosis Codes:     * PMB (postmenopausal bleeding) [N95 0]     * Thickened endometrium [R93 89]    Procedure(s) (LRB):  DILATATION AND CURETTAGE (D&C) WITH HYSTEROSCOPY (N/A)  POLYPECTOMY (N/A)    Specimen(s):  ID Type Source Tests Collected by Time Destination   1 : EMC and polyp Tissue Endometrium TISSUE EXAM Denice Eden MD 2021 [id number]        Estimated Blood Loss:   5cc  Drains:  * No LDAs found *    Anesthesia Type:   General LMA    Operative Indications:  PMB (postmenopausal bleeding) [N95 0]  Thickened endometrium [R93 89]    Fluid Deficit: 150cc    Operative Findings:   1  External genitalia grossly normal in appearance  No ulcerations, no lacerations, no lesions  Bimanual exam revealed an anteverted postmenopausal uterus with normal contours and freely mobile  No adnexal masses palpated bilaterally  2   Vagina and cervix were atrophic in appearance without any lacerations or lesions  3  Uterus sounded to 6 cm   4  Hysteroscopic examination revealed an atrophic endometrial lining  With an area of thickened endometrium suggestive of a polyp on the right lateral, posterior wall  Bilateral ostia were visualized  Complications:   None    Procedure and Technique:  Patient was taken to the operating room where a time out was performed to confirm correct patient and correct procedure  General LMA anesthesia (LMA) was administered and the patient was positioned on the OR table in the dorsal lithotomy position  All pressure points were padded and a sandi hugger was placed to maintain control of core body temperature   A bimanual exam was performed and the uterus was noted to be anteverted, smaller in size consistent with postmenopausal state with normal consistency with no palpable adnexal masses or fullness  The patient was prepped and draped in the usual sterile fashion  A straight catheter was introduced into the bladder, which was drained of 100 cc of clear yellow urine  A Coyle speculum was inserted into the vagina and a Benavides retractor was used to visualize the anterior lip of the cervix, which was then grasped with a single toothed tenaculum  The uterus was sounded to 6cm  The cervix was serially dilated to 15F using Caicedo dilators for introduction of the hysteroscope  Hysteroscope was introduced under direct visualization using normal saline solution as the distention media  Hysteroscope was advanced to the uterine fundus and the entire uterine cavity was inspected in a systematic manner  There was noted to be the findings as above  Hysteroscope was withdrawn and sharp curetting was performed, starting at the 12'oclock position and rotating a total of 360 degrees to cover all surfaces  Endometrial tissue was obtained and sent for pathology  The hysteroscope was then re-introduced under direct visualization, again using normal saline solution as the distention media  Entire uterine cavity was inspected in a systematic manner  Polyp tissue remained in the cavity from initial curettage  A hysteroscopic grasper was introduced into the cavity  The tissue was grasped and removed from the uterine cavity  The hysteroscope was introduced a final time, and adequate curetting was confirmed and hysteroscopy was withdrawn  The single toothed tenaculum was removed from the anterior lip of the cervix  Good hemostasis was confirmed at the tenaculum puncture sites  At the conclusion of the procedure, all needle, sponge, and instrument counts were noted to be correct x2  Dr Iram Brito was present and participated in all key portions of the case      Patient Disposition:  PACU     SIGNATURE: Sallie No MD  DATE: April 16, 2021  TIME: 9:01 AM

## 2021-04-23 ENCOUNTER — TELEPHONE (OUTPATIENT)
Dept: OBGYN CLINIC | Facility: CLINIC | Age: 61
End: 2021-04-23

## 2021-04-23 NOTE — TELEPHONE ENCOUNTER
Pt given pathology results and instructions  States she had been lifting very heavy bags and experienced very small amt of brt red bleeding  Reassured and adv tcb with any heavy bleeding

## 2021-04-28 ENCOUNTER — IMMUNIZATIONS (OUTPATIENT)
Dept: FAMILY MEDICINE CLINIC | Facility: HOSPITAL | Age: 61
End: 2021-04-28

## 2021-04-28 DIAGNOSIS — Z23 ENCOUNTER FOR IMMUNIZATION: Primary | ICD-10-CM

## 2021-04-28 PROCEDURE — 0012A SARS-COV-2 / COVID-19 MRNA VACCINE (MODERNA) 100 MCG: CPT

## 2021-04-28 PROCEDURE — 91301 SARS-COV-2 / COVID-19 MRNA VACCINE (MODERNA) 100 MCG: CPT

## 2021-06-24 ENCOUNTER — OFFICE VISIT (OUTPATIENT)
Dept: FAMILY MEDICINE CLINIC | Facility: CLINIC | Age: 61
End: 2021-06-24
Payer: OTHER GOVERNMENT

## 2021-06-24 VITALS
RESPIRATION RATE: 16 BRPM | HEART RATE: 56 BPM | SYSTOLIC BLOOD PRESSURE: 120 MMHG | WEIGHT: 119 LBS | TEMPERATURE: 99.8 F | HEIGHT: 63 IN | OXYGEN SATURATION: 98 % | BODY MASS INDEX: 21.09 KG/M2 | DIASTOLIC BLOOD PRESSURE: 70 MMHG

## 2021-06-24 DIAGNOSIS — J06.9 ACUTE UPPER RESPIRATORY INFECTION: Primary | ICD-10-CM

## 2021-06-24 PROCEDURE — 99213 OFFICE O/P EST LOW 20 MIN: CPT | Performed by: NURSE PRACTITIONER

## 2021-06-24 NOTE — PROGRESS NOTES
Assessment/Plan:    Reviewed supportive care  F/u for any persistent/worsening symptoms  1  Acute upper respiratory infection            Patient Instructions   Drinking plenty of fluids, saline nasal rinses or nasal spray, and steam or cool air humidification are all effective ways of managing symptoms  You may take Mucinex D for sinus congestion, or Coricidin HBP if you have a heart condition or high blood pressure  Mucinex or Robitussin DM are used to control cough symptoms and include an expectorant  You may take Ibuprofen or Tylenol as needed for pain or fevers  Recommend recheck if symptoms do not resolve or improve within 1 week  Return if symptoms worsen or fail to improve  Subjective:      Patient ID: Pb Alcala is a 57 y o  female  Chief Complaint   Patient presents with    Cough     wmcma    Sore Throat    Fever    Fatigue       Here today with complaints of fever and sore throat  Tmax 101 8  She notes mild, intermittent cough  Cough is productive  Has been feeling fatigued  Daughter was sick with similar symptoms a week or two ago  She has been vaccinated against COVID 19      The following portions of the patient's history were reviewed and updated as appropriate: allergies, current medications, past family history, past medical history, past social history, past surgical history and problem list     Review of Systems   Constitutional: Positive for fatigue and fever  Negative for chills  HENT: Positive for sore throat  Negative for congestion, ear pain, postnasal drip, rhinorrhea and sinus pressure  Respiratory: Positive for cough  Negative for shortness of breath and wheezing  Cardiovascular: Negative for chest pain  Gastrointestinal: Negative for abdominal pain, diarrhea, nausea and vomiting  Musculoskeletal: Negative for arthralgias  Skin: Negative for rash  Neurological: Negative for headaches           Current Outpatient Medications Medication Sig Dispense Refill    Multiple Vitamin (multivitamin) tablet Take 1 tablet by mouth daily       No current facility-administered medications for this visit  Objective:    /70   Pulse 56   Temp 99 8 °F (37 7 °C)   Resp 16   Ht 5' 3" (1 6 m)   Wt 54 kg (119 lb)   SpO2 98%   BMI 21 08 kg/m²        Physical Exam  Vitals and nursing note reviewed  Constitutional:       Appearance: She is well-developed and normal weight  HENT:      Head: Normocephalic and atraumatic  Right Ear: Tympanic membrane, ear canal and external ear normal       Left Ear: Tympanic membrane, ear canal and external ear normal       Nose: No mucosal edema or rhinorrhea  Mouth/Throat:      Pharynx: Uvula midline  Posterior oropharyngeal erythema present  No oropharyngeal exudate  Eyes:      Conjunctiva/sclera: Conjunctivae normal    Neck:      Thyroid: No thyromegaly  Cardiovascular:      Rate and Rhythm: Normal rate and regular rhythm  Heart sounds: Normal heart sounds  No murmur heard  Pulmonary:      Effort: Pulmonary effort is normal       Breath sounds: Normal breath sounds  Abdominal:      General: Bowel sounds are normal  There is no distension  Palpations: There is no hepatomegaly or splenomegaly  Tenderness: There is no abdominal tenderness  Musculoskeletal:      Cervical back: Neck supple  No edema  Lymphadenopathy:      Cervical:      Right cervical: No superficial cervical adenopathy  Left cervical: No superficial cervical adenopathy  Skin:     General: Skin is warm and dry  Findings: No rash  Neurological:      Mental Status: She is alert     Psychiatric:         Mood and Affect: Mood normal          Behavior: Behavior normal                 Artice Diane, CRNP

## 2021-06-26 ENCOUNTER — NURSE TRIAGE (OUTPATIENT)
Dept: OTHER | Facility: OTHER | Age: 61
End: 2021-06-26

## 2021-06-26 NOTE — TELEPHONE ENCOUNTER
Regarding: Has an upper respiratory infection with a temp of 103  ----- Message from Kaitlynn French sent at 6/26/2021  3:17 PM EDT -----  "I was seen by my doctor this thursday for an upper respiratory infection   My symptoms are getting worse I now have a fever of 103 "

## 2021-06-26 NOTE — TELEPHONE ENCOUNTER
Reason for Disposition   [1] Fever > 101 F (38 3 C) AND [2] age > 60 years    Answer Assessment - Initial Assessment Questions  1  ONSET: "When did the nasal discharge start?"       Reports congestion     2  AMOUNT: "How much discharge is there?"       Small amount     3  COUGH: "Do you have a cough?" If yes, ask: "Describe the color of your sputum" (clear, white, yellow, green)      Slightly productive cough that is clear in color     4  RESPIRATORY DISTRESS: "Describe your breathing "       Normal breathing     5  FEVER: "Do you have a fever?" If Yes, ask: "What is your temperature, how was it measured, and when did it start?"      Since Monday night  Today 103  1  Most recent temperature 102  6  Highest temp was 103 1 today  temperature doesn't break, even with medication     6  SEVERITY: "Overall, how bad are you feeling right now?" (e g , doesn't interfere with normal activities, staying home from school/work, staying in bed)       Interferes with sleep and daily activities     7   OTHER SYMPTOMS: "Do you have any other symptoms?" (e g , sore throat, earache, wheezing, vomiting)      Sore throat, loss of voice, and all over body aches Denies difficulty breathing    Protocols used: COMMON COLD-ADULT-

## 2021-06-28 ENCOUNTER — TELEPHONE (OUTPATIENT)
Dept: FAMILY MEDICINE CLINIC | Facility: CLINIC | Age: 61
End: 2021-06-28

## 2021-06-28 NOTE — TELEPHONE ENCOUNTER
Pt came in with [facility] xray report and disk from chest xray  Informed pt that we cannot read the disks here in office  Showed Dr Danielle Jha the report and explained what she wanted  Dr Danielle Jha wanted her to make f/up pneumo appt and would order another chest xray  When I told pt this she stated she did not want another xray and wanted Dr Danielle Jha to call her  She thinks if the xray results from the recent xray are the same as the one from 2017 she will not need any further action  Pls call  Report in nurse pending

## 2021-06-28 NOTE — TELEPHONE ENCOUNTER
6/28/2021 7:53 PM Called patient to discuss her chest x-ray done at [facility] on 6/26/21  X-ray shows bilateral infiltrates and possible nodule or localized infection in the right lung  A follow-up x-ray in 3 weeks was recommended  Left message on her voicemail to call the office     Ren Aponte DO

## 2021-06-29 NOTE — TELEPHONE ENCOUNTER
6/29/2021 12:55 PM Marlene Henderson returned my call and we discussed her results  She is feeling better now  Recommended that she follow up in the office in 3 weeks  I will order a follow up chest x-ray at her appointment      Message complete  Ubaldo Leyva, DO

## 2021-06-29 NOTE — TELEPHONE ENCOUNTER
6/29/2021 12:26 PM Called patient again regarding her [facility] appointment/chest x-ray  Left message on her voicemail to call the office     Tashia Eckert DO

## 2021-07-13 ENCOUNTER — OFFICE VISIT (OUTPATIENT)
Dept: FAMILY MEDICINE CLINIC | Facility: CLINIC | Age: 61
End: 2021-07-13
Payer: OTHER GOVERNMENT

## 2021-07-13 VITALS
HEIGHT: 63 IN | DIASTOLIC BLOOD PRESSURE: 78 MMHG | BODY MASS INDEX: 21.16 KG/M2 | RESPIRATION RATE: 18 BRPM | HEART RATE: 71 BPM | WEIGHT: 119.4 LBS | SYSTOLIC BLOOD PRESSURE: 124 MMHG | TEMPERATURE: 98.7 F

## 2021-07-13 DIAGNOSIS — J18.9 PNEUMONIA OF BOTH LOWER LOBES DUE TO INFECTIOUS ORGANISM: Primary | ICD-10-CM

## 2021-07-13 PROCEDURE — 99213 OFFICE O/P EST LOW 20 MIN: CPT | Performed by: FAMILY MEDICINE

## 2021-07-13 RX ORDER — ALBUTEROL SULFATE 90 UG/1
AEROSOL, METERED RESPIRATORY (INHALATION) AS NEEDED
COMMUNITY
Start: 2021-06-26

## 2021-07-13 NOTE — PROGRESS NOTES
Assessment/Plan:    1  Pneumonia of both lower lobes due to infectious organism  Comments:  Clinically improving, needs follow-up chest x-ray in 1-2 weeks  Orders:  -     XR chest pa & lateral; Future; Expected date: 07/13/2021            There are no Patient Instructions on file for this visit  Return if symptoms worsen or fail to improve  Subjective:      Patient ID: Lona Saunders is a 57 y o  female  Chief Complaint   Patient presents with    Follow-up     3 week nm/lpn       She is finally starting to feel better since having pneumonia  She was fatigued  She is still coughing a little  The following portions of the patient's history were reviewed and updated as appropriate: allergies, current medications, past family history, past medical history, past social history, past surgical history and problem list     Review of Systems      Current Outpatient Medications   Medication Sig Dispense Refill    albuterol (PROVENTIL HFA,VENTOLIN HFA) 90 mcg/act inhaler Inhale as needed       No current facility-administered medications for this visit  Objective:    /78   Pulse 71   Temp 98 7 °F (37 1 °C)   Resp 18   Ht 5' 3" (1 6 m)   Wt 54 2 kg (119 lb 6 4 oz)   BMI 21 15 kg/m²        Physical Exam  Vitals and nursing note reviewed  Constitutional:       Appearance: She is well-developed  HENT:      Head: Normocephalic and atraumatic  Right Ear: Tympanic membrane and external ear normal       Left Ear: Tympanic membrane and external ear normal    Cardiovascular:      Rate and Rhythm: Normal rate and regular rhythm  Heart sounds: Normal heart sounds  No murmur heard  No friction rub  Pulmonary:      Effort: Pulmonary effort is normal  No respiratory distress  Breath sounds: Normal breath sounds  No wheezing or rales  Musculoskeletal:      Right lower leg: No edema  Left lower leg: No edema                  Fatou Graham DO

## 2021-07-23 ENCOUNTER — HOSPITAL ENCOUNTER (OUTPATIENT)
Dept: RADIOLOGY | Facility: HOSPITAL | Age: 61
Discharge: HOME | End: 2021-07-23
Payer: OTHER GOVERNMENT

## 2021-07-23 DIAGNOSIS — J18.9 PNEUMONIA OF BOTH LOWER LOBES DUE TO INFECTIOUS ORGANISM: ICD-10-CM

## 2021-07-23 PROCEDURE — 71046 X-RAY EXAM CHEST 2 VIEWS: CPT

## 2021-07-26 ENCOUNTER — TELEPHONE (OUTPATIENT)
Dept: FAMILY MEDICINE CLINIC | Facility: CLINIC | Age: 61
End: 2021-07-26

## 2021-07-26 DIAGNOSIS — J18.9 PNEUMONIA OF BOTH LOWER LOBES DUE TO INFECTIOUS ORGANISM: Primary | ICD-10-CM

## 2021-07-26 DIAGNOSIS — R91.1 LUNG NODULE: ICD-10-CM

## 2021-07-26 RX ORDER — DOXYCYCLINE HYCLATE 100 MG
100 TABLET ORAL 2 TIMES DAILY
Qty: 20 TABLET | Refills: 0 | Status: SHIPPED | OUTPATIENT
Start: 2021-07-26 | End: 2021-07-26 | Stop reason: ALTCHOICE

## 2021-07-26 NOTE — TELEPHONE ENCOUNTER
Please let her know I would like her to do another round of antibiotics  New prescription for doxycycline sent to [facility]    Thank you,  Tashia Kumar, DO

## 2021-07-26 NOTE — TELEPHONE ENCOUNTER
7/26/2021 8:19 AM Called Chiki Santiago to discuss her abnormal chest x-ray  Is persistent bibasilar pneumonia with bilateral lung nodules which are new since August 2017  CT of chest with no contrast is recommended for further evaluation  Left message on her voicemail to call the office     Marcus Robertson DO

## 2021-07-26 NOTE — TELEPHONE ENCOUNTER
Patient really would rather not start any more ABX right now  Can we wait until after the results of the CT scan to see if it's something else  Please advise    Alda Nieto

## 2021-07-26 NOTE — TELEPHONE ENCOUNTER
Pt wanted to let you know she didn't have a fever for 2 1/2 weeks  Pt states she is starting to run a low grade fever  Typically between        FYI

## 2021-07-26 NOTE — TELEPHONE ENCOUNTER
7/26/2021 8:36 AM spoke with Bryanna Michaud regarding her abnormal chest x-ray  She to get the follow-up CT scan done  New order for CT of chest put in her chart and she will call to set up the appointment      Marlene Ponce DO

## 2021-07-27 ENCOUNTER — TELEPHONE (OUTPATIENT)
Dept: FAMILY MEDICINE CLINIC | Facility: CLINIC | Age: 61
End: 2021-07-27

## 2021-07-27 ENCOUNTER — HOSPITAL ENCOUNTER (OUTPATIENT)
Dept: RADIOLOGY | Facility: HOSPITAL | Age: 61
Discharge: HOME | End: 2021-07-27
Payer: OTHER GOVERNMENT

## 2021-07-27 DIAGNOSIS — J18.9 PNEUMONIA OF BOTH LOWER LOBES DUE TO INFECTIOUS ORGANISM: Primary | ICD-10-CM

## 2021-07-27 DIAGNOSIS — R91.1 LUNG NODULE: ICD-10-CM

## 2021-07-27 DIAGNOSIS — J18.9 PNEUMONIA OF BOTH LOWER LOBES DUE TO INFECTIOUS ORGANISM: ICD-10-CM

## 2021-07-27 PROCEDURE — 71250 CT THORAX DX C-: CPT

## 2021-07-27 PROCEDURE — G1004 CDSM NDSC: HCPCS

## 2021-07-27 RX ORDER — AZITHROMYCIN 500 MG/1
500 TABLET, FILM COATED ORAL DAILY
Qty: 5 TABLET | Refills: 0 | Status: SHIPPED | OUTPATIENT
Start: 2021-07-27 | End: 2021-08-01

## 2021-07-27 NOTE — TELEPHONE ENCOUNTER
7/27/2021 10:31 AM Called patient regarding her CT chest results  We discussed her results and that she still has pneumonia  Patient is still not feeling well and did have a low-grade temperature yesterday  Patient started on 5 day course of Zithromax for possible atypical infection  She has already completed a full course of doxycycline  She is allergic to penicillin  She agreed to get the follow-up CT scan in 3 months  If she is not improving or her fevers continue she will let me know  May consider referral to pulmonologist depending on her clinical course  Clerical-please mail order for CT scan of chest to patient    Thank you,  Marcus Robertson,

## 2021-09-03 ENCOUNTER — TELEPHONE (OUTPATIENT)
Dept: FAMILY MEDICINE CLINIC | Facility: CLINIC | Age: 61
End: 2021-09-03

## 2021-09-03 NOTE — TELEPHONE ENCOUNTER
Patient is calling to ask Dr Dejan Su if  1  Does she qualify for the covid booster vaccine? Patient states she is going to [place] next week so she would like to have booster if qualifed  2  Why is she getting a ct scan done to check her lungs again when Dr Dejan Su told her that ct scans give off more radiation? She states she is feeling much better  Please advise    Cristin Hsieh MA

## 2021-09-03 NOTE — TELEPHONE ENCOUNTER
9/3/2021 4:45 PM Returned call to patient  She is feeling much better since the second round of antibiotics  We discussed getting the CT scan and that she needs to get it done to make sure that there was nothing underneath the infection  She agreed to get the follow-up CT scan done  I also recommended that she does not get a COVID booster at this time  She is not immune compromised      Message complete  Elda Jimenez, DO

## 2021-11-05 ENCOUNTER — TELEPHONE (OUTPATIENT)
Dept: FAMILY MEDICINE CLINIC | Facility: CLINIC | Age: 61
End: 2021-11-05

## 2021-11-19 ENCOUNTER — TELEPHONE (OUTPATIENT)
Dept: FAMILY MEDICINE CLINIC | Facility: CLINIC | Age: 61
End: 2021-11-19

## 2021-11-19 DIAGNOSIS — R91.1 LUNG NODULE: ICD-10-CM

## 2021-11-19 DIAGNOSIS — J18.9 PNEUMONIA OF BOTH LOWER LOBES DUE TO INFECTIOUS ORGANISM: Primary | ICD-10-CM

## 2021-11-19 NOTE — TELEPHONE ENCOUNTER
11/19/2021 4:52 PM Please let her know that I put a new order in for her    Thank you,  Fara Irving, DO

## 2021-11-19 NOTE — TELEPHONE ENCOUNTER
Pt called about a CT scan ordered back in July  She said there was some confusion on her part and she missed the appointment and order was canceled   She needs another order in her chart for the ct scan to schedule again

## 2021-11-22 ENCOUNTER — TELEMEDICINE (OUTPATIENT)
Dept: FAMILY MEDICINE CLINIC | Facility: CLINIC | Age: 61
End: 2021-11-22
Payer: OTHER GOVERNMENT

## 2021-11-22 DIAGNOSIS — B34.9 VIRAL INFECTION, UNSPECIFIED: Primary | ICD-10-CM

## 2021-11-22 PROCEDURE — 0241U HB NFCT DS VIR RESP RNA 4 TRGT: CPT | Performed by: FAMILY MEDICINE

## 2021-11-22 PROCEDURE — 99213 OFFICE O/P EST LOW 20 MIN: CPT | Performed by: FAMILY MEDICINE

## 2021-12-03 ENCOUNTER — HOSPITAL ENCOUNTER (OUTPATIENT)
Dept: RADIOLOGY | Facility: HOSPITAL | Age: 61
Discharge: HOME | End: 2021-12-03
Payer: OTHER GOVERNMENT

## 2021-12-03 ENCOUNTER — IMMUNIZATIONS (OUTPATIENT)
Dept: FAMILY MEDICINE CLINIC | Facility: HOSPITAL | Age: 61
End: 2021-12-03
Payer: OTHER GOVERNMENT

## 2021-12-03 DIAGNOSIS — R91.1 LUNG NODULE: ICD-10-CM

## 2021-12-03 DIAGNOSIS — Z23 ENCOUNTER FOR IMMUNIZATION: Primary | ICD-10-CM

## 2021-12-03 DIAGNOSIS — J18.9 PNEUMONIA OF BOTH LOWER LOBES DUE TO INFECTIOUS ORGANISM: ICD-10-CM

## 2021-12-03 PROCEDURE — 0064A COVID-19 MODERNA VACC 0.25 ML BOOSTER: CPT

## 2021-12-03 PROCEDURE — 71250 CT THORAX DX C-: CPT

## 2021-12-03 PROCEDURE — 91306 COVID-19 MODERNA VACC 0.25 ML BOOSTER: CPT

## 2021-12-03 PROCEDURE — G1004 CDSM NDSC: HCPCS

## 2021-12-08 ENCOUNTER — TELEPHONE (OUTPATIENT)
Dept: FAMILY MEDICINE CLINIC | Facility: CLINIC | Age: 61
End: 2021-12-08

## 2021-12-08 DIAGNOSIS — J18.9 PNEUMONIA OF BOTH LOWER LOBES DUE TO INFECTIOUS ORGANISM: Primary | ICD-10-CM

## 2021-12-08 DIAGNOSIS — R91.1 LUNG NODULE: ICD-10-CM

## 2021-12-16 ENCOUNTER — TELEPHONE (OUTPATIENT)
Dept: FAMILY MEDICINE CLINIC | Facility: CLINIC | Age: 61
End: 2021-12-16

## 2021-12-21 ENCOUNTER — CLINICAL SUPPORT (OUTPATIENT)
Dept: FAMILY MEDICINE CLINIC | Facility: CLINIC | Age: 61
End: 2021-12-21
Payer: OTHER GOVERNMENT

## 2021-12-21 DIAGNOSIS — Z23 NEED FOR VACCINATION: Primary | ICD-10-CM

## 2021-12-21 PROCEDURE — 90732 PPSV23 VACC 2 YRS+ SUBQ/IM: CPT

## 2021-12-21 PROCEDURE — 90471 IMMUNIZATION ADMIN: CPT

## 2022-06-14 ENCOUNTER — HOSPITAL ENCOUNTER (OUTPATIENT)
Dept: RADIOLOGY | Facility: HOSPITAL | Age: 62
Discharge: HOME | End: 2022-06-14
Payer: OTHER GOVERNMENT

## 2022-06-14 DIAGNOSIS — J18.9 PNEUMONIA OF BOTH LOWER LOBES DUE TO INFECTIOUS ORGANISM: ICD-10-CM

## 2022-06-14 DIAGNOSIS — R91.1 LUNG NODULE: ICD-10-CM

## 2022-06-14 PROCEDURE — 71250 CT THORAX DX C-: CPT

## 2022-06-14 PROCEDURE — G1004 CDSM NDSC: HCPCS

## 2023-02-13 ENCOUNTER — TELEPHONE (OUTPATIENT)
Dept: OBGYN CLINIC | Facility: HOSPITAL | Age: 63
End: 2023-02-13

## 2023-02-13 ENCOUNTER — TELEPHONE (OUTPATIENT)
Dept: PAIN MEDICINE | Facility: CLINIC | Age: 63
End: 2023-02-13

## 2023-02-13 ENCOUNTER — OFFICE VISIT (OUTPATIENT)
Dept: OBGYN CLINIC | Facility: CLINIC | Age: 63
End: 2023-02-13

## 2023-02-13 ENCOUNTER — APPOINTMENT (OUTPATIENT)
Dept: RADIOLOGY | Facility: CLINIC | Age: 63
End: 2023-02-13

## 2023-02-13 VITALS
WEIGHT: 119 LBS | HEART RATE: 86 BPM | SYSTOLIC BLOOD PRESSURE: 126 MMHG | HEIGHT: 63 IN | TEMPERATURE: 99.1 F | DIASTOLIC BLOOD PRESSURE: 76 MMHG | BODY MASS INDEX: 21.09 KG/M2

## 2023-02-13 DIAGNOSIS — M79.642 LEFT HAND PAIN: Primary | ICD-10-CM

## 2023-02-13 DIAGNOSIS — M79.642 LEFT HAND PAIN: ICD-10-CM

## 2023-02-13 NOTE — TELEPHONE ENCOUNTER
Pt came into the office wanting to be rewrapped but when approached she was more upset that she wanted a splint with her two fingers in it  When told that the doctor was in the Operating Room she was upset that the Pa saw her instead of the DR  She was insisting on getting a new splint and we explained to her that we can't go against the order that the Pa had, But we offered to have her come see Dr Tanvi Ventura in [facility] tomorrow at 9:45  For a re- eval with Dr Tanvi Ventura

## 2023-02-13 NOTE — TELEPHONE ENCOUNTER
Caller:  Sydnee Mcfadden    Doctor: N/A    Reason for call: checking on appt    Call back#: [phone]

## 2023-02-13 NOTE — PROGRESS NOTES
Assessment/Plan:  1  Left hand pain  XR hand 3+ vw left        The patient does have a fracture of the left fourth metacarpal which is only minimally displaced  I explained that this should heal well with nonoperative treatment as long as this does not displace any further  She was placed in hand-based ulnar gutter splint today  She will follow-up in 1 week with repeat x-rays  If her fracture remains stable, we will continue nonoperative treatment in the form of splinting  This will likely be for 4 to 6 weeks  If the fracture displaces, we did discuss referral to hand surgery for possible surgical fixation  She should avoid any significant activity with this hand  She will follow-up in 1 week with repeat x-rays  Subjective: Kunal Yao is a 60 y o  female who presents today for evaluation of her left hand  She sustained an injury to this hand yesterday when she fell  She notes pain about the dorsal aspect of the hand, which is worse with pressure to this region or movement of her fingers  She notes good sensation of the left upper extremity  She does complain of some swelling and bruising about the dorsal hand  She notes good sensation of the left upper extremity  Review of Systems   Constitutional: Negative  Negative for chills and fever  HENT: Negative  Negative for ear pain and sore throat  Eyes: Negative  Negative for pain and redness  Respiratory: Negative  Negative for shortness of breath and wheezing  Cardiovascular: Negative for chest pain and palpitations  Gastrointestinal: Negative  Negative for abdominal pain and blood in stool  Endocrine: Negative  Negative for polydipsia and polyuria  Genitourinary: Negative  Negative for difficulty urinating and dysuria  Musculoskeletal:        As noted in HPI   Skin: Negative  Negative for pallor and rash  Neurological: Negative  Negative for dizziness and numbness  Hematological: Negative    Negative for adenopathy  Does not bruise/bleed easily  Psychiatric/Behavioral: Negative  Negative for confusion and suicidal ideas  Past Medical History:   Diagnosis Date   • Acute UTI     recently treated   • Cataract     bilateral   • Endometriosis    • Foot pain    • Pneumonia     recent patient taking anitbiotics now       Past Surgical History:   Procedure Laterality Date   • CATARACT EXTRACTION Bilateral    • COLONOSCOPY      Complete   • DIAGNOSTIC LAPAROSCOPY      for fertility;  Per Allscripts: For infertility, endometriosis   • NECK SURGERY      closure of hole in front of neck   • OTHER SURGICAL HISTORY      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, Infertility surgery   • [place] HYSTEROSCOPY BX ENDOMETRIUM&/POLYPC W/WO D&C N/A 4/16/2021    Procedure: DILATATION AND CURETTAGE (D&C) WITH HYSTEROSCOPY;  Surgeon: Shirley Frazier MD;  Location: [facility];  Service: Gynecology   • [place] HYSTEROSCOPY BX ENDOMETRIUM&/POLYPC W/WO D&C N/A 4/16/2021    Procedure: POLYPECTOMY;  Surgeon: Shirley Frazier MD;  Location:  MAIN OR;  Service: Gynecology   • AK XCAPSL CTRC RMVL INSJ IO LENS PROSTH W/O ECP Left 3/7/2016    Procedure: EXTRACTION EXTRACAPSULAR CATARACT PHACO INTRAOCULAR LENS (IOL); Surgeon: Cornelia Morse MD;  Location: [facility] MAIN OR;  Service: Ophthalmology   • AK XCAPSL CTRC RMVL INSJ IO LENS PROSTH W/O ECP Right 2/8/2016    Procedure: EXTRACTION EXTRACAPSULAR CATARACT PHACO INTRAOCULAR LENS (IOL);   Surgeon: Cornelia Morse MD;  Location: [facility] MAIN OR;  Service: Ophthalmology       Family History   Problem Relation Age of Onset   • Cancer Mother 43        breast   • Arthritis Mother    • Other Mother         Cardiac disorder   • Osteoporosis Mother    • Skin cancer Mother    • Breast cancer Mother 43   • BRCA1 Negative Mother    • BRCA2 Negative Mother    • Cancer Sister         breast   • Breast cancer Sister 39   • Cancer Brother         neck   • Diabetes Father    • Arthritis Maternal Grandmother    • Ovarian cancer Maternal Grandmother 79   • Hypertension Maternal Grandfather    • Other Paternal Grandfather         Cardiac disorder   • Diabetes Other         Paternal Relatives       Social History     Occupational History   • Not on file   Tobacco Use   • Smoking status: Never   • Smokeless tobacco: Never   Vaping Use   • Vaping Use: Never used   Substance and Sexual Activity   • Alcohol use: Yes     Alcohol/week: 7 0 standard drinks     Types: 7 Glasses of wine per week   • Drug use: No   • Sexual activity: Yes     Birth control/protection: Post-menopausal         Current Outpatient Medications:   •  albuterol (PROVENTIL HFA,VENTOLIN HFA) 90 mcg/act inhaler, Inhale as needed (Patient not taking: Reported on 2/13/2023), Disp: , Rfl:     Allergies   Allergen Reactions   • Penicillin V Rash       Objective:  Vitals:    02/13/23 1023   BP: 126/76   Pulse: 86   Temp: 99 1 °F (37 3 °C)       Ortho Exam   Left hand: Mild swelling and ecchymosis dorsally  Tenderness dorsal fourth metacarpal   Patient able to extend all digits fully  Pain with attempts at making full fist   Sensation intact median, ulnar, and radial nerve distributions  2+ radial pulse  Physical Exam  Constitutional:       General: She is not in acute distress  Appearance: She is well-developed  HENT:      Head: Normocephalic and atraumatic  Eyes:      General: No scleral icterus  Conjunctiva/sclera: Conjunctivae normal    Neck:      Vascular: No JVD  Cardiovascular:      Rate and Rhythm: Normal rate  Pulmonary:      Effort: Pulmonary effort is normal  No respiratory distress  Skin:     General: Skin is warm  Neurological:      Mental Status: She is alert and oriented to person, place, and time        Coordination: Coordination normal          I have personally reviewed pertinent films in PACS and my interpretation is as follows:  X-rays left hand: Minimally displaced oblique fracture of the fourth metacarpal       This document was created using speech voice recognition software  Grammatical errors, random word insertions, pronoun errors, and incomplete sentences are an occasional consequence of this system due to software limitations, ambient noise, and hardware issues  Any formal questions or concerns about content, text, or information contained within the body of this dictation should be directly addressed to the provider for clarification

## 2023-02-13 NOTE — TELEPHONE ENCOUNTER
Caller: Patient    Doctor: Navarro Escobar PA-C    Reason for call: Patient called stating that she had her wrist wrapped today at the [facility] office and she feels as though it is loose and is not providing any immobilization  Call made to  and patient advised to come back to office and have it rewrapped      Call back#: [phone]

## 2023-02-14 ENCOUNTER — OFFICE VISIT (OUTPATIENT)
Dept: OBGYN CLINIC | Facility: CLINIC | Age: 63
End: 2023-02-14

## 2023-02-14 DIAGNOSIS — S62.325A CLOSED DISPLACED FRACTURE OF SHAFT OF FOURTH METACARPAL BONE OF LEFT HAND, INITIAL ENCOUNTER: Primary | ICD-10-CM

## 2023-02-14 NOTE — PROGRESS NOTES
Assessment/Plan:  1  Closed displaced fracture of shaft of fourth metacarpal bone of left hand, initial encounter  Sling        Scribe Attestation    I,:  Jesús Ortiz am acting as a scribe while in the presence of the attending physician :       I,:  Ryan Sanchez MD personally performed the services described in this documentation    as scribed in my presence :         Trae Mcgee upon exam and review of the x-rays of the left hand does demonstrate a stable minimally displaced oblique fracture at the fourth metacarpal shaft  I did explain that this is a stable fracture due to its location  Her finger demonstrates appropriate rotation  I did explain splinting options for her today as she did inquire about immobilization of the small finger and ring finger  I did explain that this stiffness into the fingers  I did explain that an immobilization of the fingers would require flexion at the MCP joint at 90 degrees with the IP and DIP joints in extension  I did explain that in this position the ligaments are on stretch  This would then help facilitate improvements in range of motion following immobilization  However, she does have a small abrasion on the radial aspect of her ring finger that would likely have decreased healing potential if full immobilization in a clamshell of the hand is completed today  With this in mind, I do believe use of a sherin tape to the ring finger and small finger would be appropriate at this time to allow the abrasion to heal   However, advised on changing the tape in each day to check on the skin integrity underneath the tape  She is to remain in the splint at all times over the next week  She was provided a sling for additional support upon her request and I would like to see her back in 1 week for repeat clinical evaluation and repeat x-ray  Subjective: Yumiko Neal is a 60 y o  female who presents for evaluation of her left hand    She did suffer a traumatic fall 2 days ago  Pain is localized to the dorsal aspect of the hand and is exacerbated with any motion of the fingers or hand  She states that the small finger and ring finger more often but causing further exacerbation of her pain  She notes that the pain is quite severe at the dorsal and palmar aspect of her hand  She denies any distal paresthesias today  She did had additional wrap to her hand and splint for additional support of the small finger and ring finger  Review of Systems   Constitutional: Negative for chills, fever and unexpected weight change  HENT: Negative for hearing loss, nosebleeds and sore throat  Eyes: Negative for pain, redness and visual disturbance  Respiratory: Negative for cough, shortness of breath and wheezing  Cardiovascular: Negative for chest pain, palpitations and leg swelling  Gastrointestinal: Negative for abdominal pain, nausea and vomiting  Endocrine: Negative for polydipsia and polyuria  Genitourinary: Negative for dysuria and hematuria  Musculoskeletal: Positive for arthralgias and myalgias  See HPI   Skin: Negative for rash and wound  Neurological: Negative for dizziness, numbness and headaches  Psychiatric/Behavioral: Negative for decreased concentration and suicidal ideas  The patient is not nervous/anxious  Past Medical History:   Diagnosis Date   • Acute UTI     recently treated   • Cataract     bilateral   • Endometriosis    • Foot pain    • Pneumonia     recent patient taking anitbiotics now       Past Surgical History:   Procedure Laterality Date   • CATARACT EXTRACTION Bilateral    • COLONOSCOPY      Complete   • DIAGNOSTIC LAPAROSCOPY      for fertility;  Per Allscripts:   For infertility, endometriosis   • NECK SURGERY      closure of hole in front of neck   • OTHER SURGICAL HISTORY      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, Infertility surgery   • [place] HYSTEROSCOPY BX ENDOMETRIUM&/POLYPC W/WO D&C N/A 4/16/2021 Procedure: DILATATION AND CURETTAGE (D&C) WITH HYSTEROSCOPY;  Surgeon: Hayes Leon MD;  [facility]: [facility];  Service: Gynecology   • [place] HYSTEROSCOPY BX ENDOMETRIUM&/POLYPC W/WO D&C N/A 4/16/2021    Procedure: POLYPECTOMY;  Surgeon: Hayes Leon MD;  Location:  MAIN OR;  Service: Gynecology   • [place] XCAPSL CTRC RMVL INSJ IO LENS PROSTH W/O ECP Left 3/7/2016    Procedure: EXTRACTION EXTRACAPSULAR CATARACT PHACO INTRAOCULAR LENS (IOL); Surgeon: Jack Parson MD;  Location: [facility] MAIN OR;  Service: Ophthalmology   • [place] XCAPSL CTRC RMVL INSJ IO LENS PROSTH W/O ECP Right 2/8/2016    Procedure: EXTRACTION EXTRACAPSULAR CATARACT PHACO INTRAOCULAR LENS (IOL); Surgeon: Jack Parson MD;  Location: [facility] MAIN OR;  Service: Ophthalmology       Family History   Problem Relation Age of Onset   • Cancer Mother 43        breast   • Arthritis Mother    • Other Mother         Cardiac disorder   • Osteoporosis Mother    • Skin cancer Mother    • Breast cancer Mother 43   • BRCA1 Negative Mother    • BRCA2 Negative Mother    • Cancer Sister         breast   • Breast cancer Sister 39   • Cancer Brother         neck   • Diabetes Father    • Arthritis Maternal Grandmother    • Ovarian cancer Maternal Grandmother 79   • Hypertension Maternal Grandfather    • Other Paternal Grandfather         Cardiac disorder   • Diabetes Other         Paternal Relatives       Social History     Occupational History   • Not on file   Tobacco Use   • Smoking status: Never   • Smokeless tobacco: Never   Vaping Use   • Vaping Use: Never used   Substance and Sexual Activity   • Alcohol use:  Yes     Alcohol/week: 7 0 standard drinks     Types: 7 Glasses of wine per week   • Drug use: No   • Sexual activity: Yes     Birth control/protection: Post-menopausal         Current Outpatient Medications:   •  albuterol (PROVENTIL HFA,VENTOLIN HFA) 90 mcg/act inhaler, Inhale as needed (Patient not taking: Reported on 2/14/2023), Disp: , Rfl: Allergies   Allergen Reactions   • Penicillin V Rash       Objective: There were no vitals filed for this visit  Left Hand Exam     Tenderness   The patient is experiencing tenderness in the dorsal area and palmar area  Other   Erythema: absent  Scars: absent  Sensation: normal  Pulse: present    Comments:  Ring finger is rationally appropriate with flexion of the finger            Physical Exam  Vitals reviewed  HENT:      Head: Normocephalic and atraumatic  Eyes:      General:         Right eye: No discharge  Left eye: No discharge  Conjunctiva/sclera: Conjunctivae normal       Pupils: Pupils are equal, round, and reactive to light  Cardiovascular:      Rate and Rhythm: Normal rate  Pulmonary:      Effort: Pulmonary effort is normal  No respiratory distress  Musculoskeletal:      Cervical back: Normal range of motion and neck supple  Comments: As noted in HPI   Skin:     General: Skin is warm and dry  Neurological:      Mental Status: She is alert and oriented to person, place, and time  I have personally reviewed pertinent films in PACS and my interpretation is as follows:    X-rays of the left hand demonstrates a minimally displaced oblique fracture at the shaft of the fourth metacarpal       This document was created using speech voice recognition software  Grammatical errors, random word insertions, pronoun errors, and incomplete sentences are an occasional consequence of this system due to software limitations, ambient noise, and hardware issues  Any formal questions or concerns about content, text, or information contained within the body of this dictation should be directly addressed to the provider for clarification

## 2023-02-21 ENCOUNTER — APPOINTMENT (OUTPATIENT)
Dept: RADIOLOGY | Facility: CLINIC | Age: 63
End: 2023-02-21

## 2023-02-21 ENCOUNTER — OFFICE VISIT (OUTPATIENT)
Dept: OBGYN CLINIC | Facility: CLINIC | Age: 63
End: 2023-02-21

## 2023-02-21 VITALS
DIASTOLIC BLOOD PRESSURE: 86 MMHG | HEART RATE: 65 BPM | SYSTOLIC BLOOD PRESSURE: 131 MMHG | WEIGHT: 122 LBS | BODY MASS INDEX: 21.62 KG/M2 | HEIGHT: 63 IN

## 2023-02-21 DIAGNOSIS — S62.325D CLOSED DISPLACED FRACTURE OF SHAFT OF FOURTH METACARPAL BONE OF LEFT HAND WITH ROUTINE HEALING, SUBSEQUENT ENCOUNTER: Primary | ICD-10-CM

## 2023-02-21 DIAGNOSIS — S62.325A CLOSED DISPLACED FRACTURE OF SHAFT OF FOURTH METACARPAL BONE OF LEFT HAND, INITIAL ENCOUNTER: ICD-10-CM

## 2023-02-21 NOTE — PROGRESS NOTES
Assessment/Plan:  1  Closed displaced fracture of shaft of fourth metacarpal bone of left hand with routine healing, subsequent encounter  XR hand 3+ vw left    Splint application        Scribe Attestation    I,:  Amira Chauhan Maurice am acting as a scribe while in the presence of the attending physician :       I,:  Chayo Wiley MD personally performed the services described in this documentation    as scribed in my presence :       Splint application    Date/Time: 2/21/2023 11:44 AM  Performed by: Chayo Wiley MD  Authorized by: Chayo iWley MD   [place] Protocol:  Procedure performed by: (MA)  Consent: Verbal consent obtained  Risks and benefits: risks, benefits and alternatives were discussed  Consent given by: patient  Time out: Immediately prior to procedure a "time out" was called to verify the correct patient, procedure, equipment, support staff and site/side marked as required  Patient understanding: patient states understanding of the procedure being performed  Patient consent: the patient's understanding of the procedure matches consent given      Pre-procedure details:     Sensation:  Normal  Procedure details:     Laterality:  Left    Location:  Hand    Hand:  L hand    Splint type:  Ulnar gutter    Supplies:  Ortho-Glass  Post-procedure details:     Pain:  Unchanged    Sensation:  Normal    Patient tolerance of procedure: Tolerated well, no immediate complications          Ed Meckel and I reviewed her x-rays together  There has been no further displacement of the fourth metacarpal fracture of the left hand  The left ring finger has normal alignment with no rotation  The left ring has normal length  There is excellent perfusion distally and normal sensation  We will continue to treat this nonoperatively    She was fitted with a new ulnar gutter splint to only incorporate the hand and not the wrist   She can remove this when bathing and when resting but should wear it while sleeping  She was instructed on splint care  She will follow-up in 3 weeks for repeat x-rays  Subjective: Prashant Sotomayor is a 60 y o  female who presents for 1 week follow-up evaluation of the left hand fourth metacarpal fracture suffered 9 days ago  She was treated nonoperatively with an ulnar gutter splint  She states she was very compliant with the use of the splint and has been cautious with use of the left hand  She does complain of intermittent pain with sudden movements or with actively moving the left ring finger  Today she has no pain with rest   She denies distal paresthesias  Review of Systems   Constitutional: Negative for chills, fever and unexpected weight change  HENT: Negative for hearing loss, nosebleeds and sore throat  Eyes: Negative for pain, redness and visual disturbance  Respiratory: Negative for cough, shortness of breath and wheezing  Cardiovascular: Negative for chest pain, palpitations and leg swelling  Gastrointestinal: Negative for abdominal pain, nausea and vomiting  Endocrine: Negative for polydipsia and polyuria  Genitourinary: Negative for dysuria and hematuria  Musculoskeletal:        See HPI   Skin: Negative for rash and wound  Neurological: Negative for dizziness, numbness and headaches  Psychiatric/Behavioral: Negative for decreased concentration and suicidal ideas  The patient is not nervous/anxious  Past Medical History:   Diagnosis Date   • Acute UTI     recently treated   • Cataract     bilateral   • Endometriosis    • Foot pain    • Pneumonia     recent patient taking anitbiotics now       Past Surgical History:   Procedure Laterality Date   • CATARACT EXTRACTION Bilateral    • COLONOSCOPY      Complete   • DIAGNOSTIC LAPAROSCOPY      for fertility;  Per Allscripts:   For infertility, endometriosis   • NECK SURGERY      closure of hole in front of neck   • OTHER SURGICAL HISTORY      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, Infertility surgery   • [place] HYSTEROSCOPY BX ENDOMETRIUM&/POLYPC W/WO D&C N/A 4/16/2021    Procedure: DILATATION AND CURETTAGE (D&C) WITH HYSTEROSCOPY;  Surgeon: Eliezer Quiñonez MD;  Location:  MAIN OR;  Service: Gynecology   • [place] HYSTEROSCOPY BX ENDOMETRIUM&/POLYPC W/WO D&C N/A 4/16/2021    Procedure: POLYPECTOMY;  Surgeon: Eliezer Quiñonez MD;  Location:  MAIN OR;  Service: Gynecology   • [place] XCAPSL CTRC RMVL INSJ IO LENS PROSTH W/O ECP Left 3/7/2016    Procedure: EXTRACTION EXTRACAPSULAR CATARACT PHACO INTRAOCULAR LENS (IOL); Surgeon: Lilly Bruce MD;  Location: [facility] MAIN OR;  Service: Ophthalmology   • [place] XCAPSL CTRC RMVL INSJ IO LENS PROSTH W/O ECP Right 2/8/2016    Procedure: EXTRACTION EXTRACAPSULAR CATARACT PHACO INTRAOCULAR LENS (IOL); Surgeon: Lilly Bruce MD;  Location: [facility] MAIN OR;  Service: Ophthalmology       Family History   Problem Relation Age of Onset   • Cancer Mother 43        breast   • Arthritis Mother    • Other Mother         Cardiac disorder   • Osteoporosis Mother    • Skin cancer Mother    • Breast cancer Mother 43   • BRCA1 Negative Mother    • BRCA2 Negative Mother    • Cancer Sister         breast   • Breast cancer Sister 39   • Cancer Brother         neck   • Diabetes Father    • Arthritis Maternal Grandmother    • Ovarian cancer Maternal Grandmother 79   • Hypertension Maternal Grandfather    • Other Paternal Grandfather         Cardiac disorder   • Diabetes Other         Paternal Relatives       Social History     Occupational History   • Not on file   Tobacco Use   • Smoking status: Never   • Smokeless tobacco: Never   Vaping Use   • Vaping Use: Never used   Substance and Sexual Activity   • Alcohol use:  Yes     Alcohol/week: 7 0 standard drinks     Types: 7 Glasses of wine per week   • Drug use: No   • Sexual activity: Yes     Birth control/protection: Post-menopausal         Current Outpatient Medications:   •  albuterol (PROVENTIL HFA,VENTOLIN HFA) 90 mcg/act inhaler, Inhale as needed (Patient not taking: Reported on 2/14/2023), Disp: , Rfl:     Allergies   Allergen Reactions   • Penicillin V Rash       Objective:  Vitals:    02/21/23 1117   BP: 131/86   Pulse: 65       Left Hand Exam     Range of Motion   Hand   MP Ring: abnormal     Other   Sensation: normal    Comments:  No rotation of the left ring  Normal length  Motion of the of the left ring is limited in flexion secondary to pain  Normal capillary refill  Physical Exam  Vitals reviewed  Constitutional:       Appearance: She is well-developed  HENT:      Head: Normocephalic and atraumatic  Eyes:      General:         Right eye: No discharge  Left eye: No discharge  Conjunctiva/sclera: Conjunctivae normal    Cardiovascular:      Rate and Rhythm: Regular rhythm  Pulmonary:      Effort: Pulmonary effort is normal  No respiratory distress  Breath sounds: No stridor  Musculoskeletal:      Cervical back: Normal range of motion and neck supple  Skin:     General: Skin is warm and dry  Neurological:      Mental Status: She is alert and oriented to person, place, and time  Psychiatric:         Behavior: Behavior normal          I have personally reviewed pertinent films in PACS and my interpretation is as follows:  Xrays of the left hand taken today demonstrate a minimally displaced fourth metacarpal fracture  There has not been progressive displacement in comparison to previous imaging  This document was created using speech voice recognition software  Grammatical errors, random word insertions, pronoun errors, and incomplete sentences are an occasional consequence of this system due to software limitations, ambient noise, and hardware issues  Any formal questions or concerns about content, text, or information contained within the body of this dictation should be directly addressed to the provider for clarification

## 2023-05-05 ENCOUNTER — TELEPHONE (OUTPATIENT)
Dept: FAMILY MEDICINE CLINIC | Facility: CLINIC | Age: 63
End: 2023-05-05

## 2023-05-05 DIAGNOSIS — Z13.6 SCREENING FOR CARDIOVASCULAR CONDITION: Primary | ICD-10-CM

## 2023-05-05 DIAGNOSIS — Z13.29 SCREENING FOR THYROID DISORDER: ICD-10-CM

## 2023-05-05 NOTE — TELEPHONE ENCOUNTER
Marni    Patient is scheduled for a physical with you next week on weds  She is moving and would like labs ordered for lab emily also  She is going to do them Monday

## 2023-05-07 LAB
ALBUMIN SERPL-MCNC: 4.7 G/DL (ref 3.8–4.8)
ALBUMIN/GLOB SERPL: 2.1 {RATIO} (ref 1.2–2.2)
ALP SERPL-CCNC: 57 IU/L (ref 44–121)
ALT SERPL-CCNC: 13 IU/L (ref 0–32)
AST SERPL-CCNC: 18 IU/L (ref 0–40)
BASOPHILS # BLD AUTO: 0.1 X10E3/UL (ref 0–0.2)
BASOPHILS NFR BLD AUTO: 1 %
BILIRUB SERPL-MCNC: 0.4 MG/DL (ref 0–1.2)
BUN SERPL-MCNC: 14 MG/DL (ref 8–27)
BUN/CREAT SERPL: 18 (ref 12–28)
CALCIUM SERPL-MCNC: 10 MG/DL (ref 8.7–10.3)
CHLORIDE SERPL-SCNC: 100 MMOL/L (ref 96–106)
CHOLEST SERPL-MCNC: 251 MG/DL (ref 100–199)
CHOLEST/HDLC SERPL: 2.6 RATIO (ref 0–4.4)
CO2 SERPL-SCNC: 22 MMOL/L (ref 20–29)
CREAT SERPL-MCNC: 0.77 MG/DL (ref 0.57–1)
EGFR: 86 ML/MIN/1.73
EOSINOPHIL # BLD AUTO: 0.2 X10E3/UL (ref 0–0.4)
EOSINOPHIL NFR BLD AUTO: 2 %
ERYTHROCYTE [DISTWIDTH] IN BLOOD BY AUTOMATED COUNT: 13.9 % (ref 11.7–15.4)
GLOBULIN SER-MCNC: 2.2 G/DL (ref 1.5–4.5)
GLUCOSE SERPL-MCNC: 94 MG/DL (ref 70–99)
HCT VFR BLD AUTO: 41.1 % (ref 34–46.6)
HDLC SERPL-MCNC: 97 MG/DL
HGB BLD-MCNC: 13.6 G/DL (ref 11.1–15.9)
IMM GRANULOCYTES # BLD: 0 X10E3/UL (ref 0–0.1)
IMM GRANULOCYTES NFR BLD: 0 %
LDLC SERPL CALC-MCNC: 144 MG/DL (ref 0–99)
LYMPHOCYTES # BLD AUTO: 4 X10E3/UL (ref 0.7–3.1)
LYMPHOCYTES NFR BLD AUTO: 43 %
MCH RBC QN AUTO: 29.8 PG (ref 26.6–33)
MCHC RBC AUTO-ENTMCNC: 33.1 G/DL (ref 31.5–35.7)
MCV RBC AUTO: 90 FL (ref 79–97)
MICRODELETION SYND BLD/T FISH: NORMAL
MONOCYTES # BLD AUTO: 0.7 X10E3/UL (ref 0.1–0.9)
MONOCYTES NFR BLD AUTO: 8 %
NEUTROPHILS # BLD AUTO: 4.4 X10E3/UL (ref 1.4–7)
NEUTROPHILS NFR BLD AUTO: 46 %
PLATELET # BLD AUTO: 333 X10E3/UL (ref 150–450)
POTASSIUM SERPL-SCNC: 4.7 MMOL/L (ref 3.5–5.2)
PROT SERPL-MCNC: 6.9 G/DL (ref 6–8.5)
RBC # BLD AUTO: 4.57 X10E6/UL (ref 3.77–5.28)
SL AMB VLDL CHOLESTEROL CALC: 10 MG/DL (ref 5–40)
SODIUM SERPL-SCNC: 138 MMOL/L (ref 134–144)
TRIGL SERPL-MCNC: 61 MG/DL (ref 0–149)
TSH SERPL DL<=0.005 MIU/L-ACNC: 3.95 UIU/ML (ref 0.45–4.5)
WBC # BLD AUTO: 9.4 X10E3/UL (ref 3.4–10.8)

## 2023-05-10 ENCOUNTER — OFFICE VISIT (OUTPATIENT)
Dept: FAMILY MEDICINE CLINIC | Facility: CLINIC | Age: 63
End: 2023-05-10

## 2023-05-10 VITALS
HEART RATE: 67 BPM | WEIGHT: 114 LBS | TEMPERATURE: 98 F | SYSTOLIC BLOOD PRESSURE: 130 MMHG | BODY MASS INDEX: 20.2 KG/M2 | OXYGEN SATURATION: 97 % | RESPIRATION RATE: 16 BRPM | HEIGHT: 63 IN | DIASTOLIC BLOOD PRESSURE: 70 MMHG

## 2023-05-10 DIAGNOSIS — Z23 NEED FOR VACCINATION: ICD-10-CM

## 2023-05-10 DIAGNOSIS — Z00.00 ROUTINE ADULT HEALTH MAINTENANCE: Primary | ICD-10-CM

## 2023-05-10 DIAGNOSIS — Z12.31 ENCOUNTER FOR SCREENING MAMMOGRAM FOR MALIGNANT NEOPLASM OF BREAST: ICD-10-CM

## 2023-05-10 RX ORDER — POLYETHYLENE GLYCOL 3350, SODIUM SULFATE, SODIUM CHLORIDE, POTASSIUM CHLORIDE, ASCORBIC ACID, SODIUM ASCORBATE 140-9-5.2G
KIT ORAL
COMMUNITY
Start: 2023-05-03

## 2023-05-10 NOTE — PROGRESS NOTES
[address] [facility]    NAME: Paxton Shaver  AGE: 58 y o  SEX: female  : 1960     DATE: 5/10/2023     Assessment and Plan:     Problem List Items Addressed This Visit    None  Visit Diagnoses     Routine adult health maintenance    -  Primary    Need for vaccination        Relevant Orders    TDAP VACCINE GREATER THAN OR EQUAL TO 6YO IM (Completed)    Encounter for screening mammogram for malignant neoplasm of breast        Relevant Orders    Mammo screening bilateral w 3d & cad          Immunizations and preventive care screenings were discussed with patient today  Appropriate education was printed on patient's after visit summary  Counseling:  Dental Health: discussed importance of regular tooth brushing, flossing, and dental visits  Exercise: the importance of regular exercise/physical activity was discussed  Recommend exercise 3-5 times per week for at least 30 minutes  Depression Screening and Follow-up Plan: Patient was screened for depression during today's encounter  They screened negative with a PHQ-2 score of 0  No follow-ups on file  Chief Complaint:     Chief Complaint   Patient presents with   • Physical Exam     wmcma      History of Present Illness:     Adult Annual Physical   Patient here for a comprehensive physical exam  The patient reports no problems  Pt is moving to [place] next week  Diet and Physical Activity  Diet/Nutrition: well balanced diet  Exercise: no formal exercise  Depression Screening  PHQ-2/9 Depression Screening    Little interest or pleasure in doing things: 0 - not at all  Feeling down, depressed, or hopeless: 0 - not at all  PHQ-2 Score: 0  PHQ-2 Interpretation: Negative depression screen       General Health  Sleep: sleeps well  Hearing: normal - bilateral   Vision: goes for regular eye exams  Dental: regular dental visits         /GYN Health  Patient is: postmenopausal  Contraceptive method: postmenopausal   Pap smear due 2024  Review of Systems:     Review of Systems   Past Medical History:     Past Medical History:   Diagnosis Date   • Acute UTI     recently treated   • Cataract     bilateral   • Endometriosis    • Foot pain    • Pneumonia     recent patient taking anitbiotics now      Past Surgical History:     Past Surgical History:   Procedure Laterality Date   • CATARACT EXTRACTION Bilateral    • COLONOSCOPY      Complete   • DIAGNOSTIC LAPAROSCOPY      for fertility;  Per Allscripts: For infertility, endometriosis   • NECK SURGERY      closure of hole in front of neck   • OTHER SURGICAL HISTORY      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, Infertility surgery   • OK HYSTEROSCOPY BX ENDOMETRIUM&/POLYPC W/WO D&C N/A 4/16/2021    Procedure: DILATATION AND CURETTAGE (D&C) WITH HYSTEROSCOPY;  Surgeon: Halle Melgoza MD;  Location: [facility];  Service: Gynecology   • OK HYSTEROSCOPY BX ENDOMETRIUM&/POLYPC W/WO D&C N/A 4/16/2021    Procedure: POLYPECTOMY;  Surgeon: Halle Melgoza MD;  Location:  MAIN OR;  Service: Gynecology   • OK XCAPSL CTRC RMVL INSJ IO LENS PROSTH W/O ECP Left 3/7/2016    Procedure: EXTRACTION EXTRACAPSULAR CATARACT PHACO INTRAOCULAR LENS (IOL); Surgeon: Linda Knight MD;  Location: [facility] MAIN OR;  Service: Ophthalmology   • OK XCAPSL CTRC RMVL INSJ IO LENS PROSTH W/O ECP Right 2/8/2016    Procedure: EXTRACTION EXTRACAPSULAR CATARACT PHACO INTRAOCULAR LENS (IOL);   Surgeon: Linda Knight MD;  Location: [facility] MAIN OR;  Service: Ophthalmology      Social History:     Social History     Socioeconomic History   • Marital status: /Civil Union     Spouse name: None   • Number of children: None   • Years of education: None   • Highest education level: None   Occupational History   • None   Tobacco Use   • Smoking status: Never     Passive exposure: Never   • Smokeless tobacco: Never   Vaping Use   • Vaping Use: Never "used   Substance and Sexual Activity   • Alcohol use: Yes     Alcohol/week: 7 0 standard drinks     Types: 7 Glasses of wine per week   • Drug use: No   • Sexual activity: Yes     Birth control/protection: Post-menopausal   Other Topics Concern   • None   Social History Narrative    Drinks coffee (2 cups a day)    Lack of exercise     Social Determinants of Health     Financial Resource Strain: Not on file   Food Insecurity: Not on file   Transportation Needs: Not on file   Physical Activity: Not on file   Stress: Not on file   Social Connections: Not on file   Intimate Partner Violence: Not on file   Housing Stability: Not on file      Family History:     Family History   Problem Relation Age of Onset   • Cancer Mother 43        breast   • Arthritis Mother    • Other Mother         Cardiac disorder   • Osteoporosis Mother    • Skin cancer Mother    • Breast cancer Mother 43   • BRCA1 Negative Mother    • BRCA2 Negative Mother    • Cancer Sister         breast   • Breast cancer Sister 39   • Cancer Brother         neck   • Diabetes Father    • Arthritis Maternal Grandmother    • Ovarian cancer Maternal Grandmother 79   • Hypertension Maternal Grandfather    • Other Paternal Grandfather         Cardiac disorder   • Diabetes Other         Paternal Relatives      Current Medications:     Current Outpatient Medications   Medication Sig Dispense Refill   • albuterol (PROVENTIL HFA,VENTOLIN HFA) 90 mcg/act inhaler Inhale as needed (Patient not taking: Reported on 2/14/2023)     • Plenvu 140 g SOLR  (Patient not taking: Reported on 5/10/2023)       No current facility-administered medications for this visit  Allergies:      Allergies   Allergen Reactions   • Penicillin V Rash      Physical Exam:     /70 (BP Location: Left arm, Patient Position: Sitting, Cuff Size: Standard)   Pulse 67   Temp 98 °F (36 7 °C) (Temporal)   Resp 16   Ht 5' 3\" (1 6 m)   Wt 51 7 kg (114 lb)   SpO2 97%   BMI 20 19 kg/m² " Physical Exam  Vitals and nursing note reviewed  Constitutional:       General: She is not in acute distress  Appearance: Normal appearance  She is well-developed  HENT:      Head: Normocephalic and atraumatic  Right Ear: Tympanic membrane normal       Left Ear: Tympanic membrane normal    Eyes:      Extraocular Movements: Extraocular movements intact  Conjunctiva/sclera: Conjunctivae normal       Pupils: Pupils are equal, round, and reactive to light  Cardiovascular:      Rate and Rhythm: Normal rate and regular rhythm  Pulses: Normal pulses  Heart sounds: No murmur heard  Pulmonary:      Effort: Pulmonary effort is normal  No respiratory distress  Breath sounds: Normal breath sounds  Abdominal:      Palpations: Abdomen is soft  Tenderness: There is no abdominal tenderness  Musculoskeletal:         General: No swelling  Cervical back: Neck supple  Lymphadenopathy:      Cervical: No cervical adenopathy  Skin:     General: Skin is warm and dry  Capillary Refill: Capillary refill takes less than 2 seconds  Neurological:      Mental Status: She is alert  Sensory: No sensory deficit        Coordination: Coordination normal       Deep Tendon Reflexes: Reflexes normal    Psychiatric:         Mood and Affect: Mood normal          Behavior: Behavior normal           Aziza Short, [facility]

## 2023-05-30 ENCOUNTER — TELEPHONE (OUTPATIENT)
Dept: FAMILY MEDICINE CLINIC | Facility: CLINIC | Age: 63
End: 2023-05-30

## 2023-05-30 NOTE — TELEPHONE ENCOUNTER
Patient called and left message on machine  She stated that she has been losing weight and is concerned that she may have cancer  She is looking to speak with Dr Parris Thornton regarding getting tested and other options/advise on what to do going forward    Natalio Goel, CMA

## 2023-05-31 NOTE — TELEPHONE ENCOUNTER
5/31/2023 3:10 PM Called [facility]     She has been losing weight over the past couple of months  She has temporarily moved to [place]  She has been having GI symptoms and some burping  She is going to see an internist down there       Message complete  Ramsey Jackson, DO

## 2023-05-31 NOTE — TELEPHONE ENCOUNTER
Please ask her to set up an appointment   I need to see her to figure out what the right testing is to get done    Thank you,  Damian Darden, DO

## 2023-05-31 NOTE — TELEPHONE ENCOUNTER
Patient is in [place]  She would like to know what Dr she can she immediatley down there to tell her if she has cancer or not  Should she see an internist or a GI? Please call back

## 2023-06-08 ENCOUNTER — TELEPHONE (OUTPATIENT)
Dept: FAMILY MEDICINE CLINIC | Facility: CLINIC | Age: 63
End: 2023-06-08

## 2023-06-08 NOTE — TELEPHONE ENCOUNTER
Jaylan Griffin left a message requesting that we fax her Mammogram  Order to [phone]  She is now living in [place]  She also is looking for previous copies of Mammogram reports to the same fax   Dary Meraz

## 2023-06-08 NOTE — TELEPHONE ENCOUNTER
Faxed and confirmation received      Scanned ENTIRE document that was faxed into patients chart    No further action needed

## 2023-11-28 ENCOUNTER — TELEPHONE (OUTPATIENT)
Age: 63
End: 2023-11-28

## 2023-11-28 DIAGNOSIS — E78.5 DYSLIPIDEMIA: ICD-10-CM

## 2023-11-28 DIAGNOSIS — R00.2 PALPITATIONS: Primary | ICD-10-CM

## 2023-11-28 NOTE — TELEPHONE ENCOUNTER
I am happy to write an order for her, but I need the reason she would like to be seen.   Thank you,  Uzair Riley, DO

## 2023-11-28 NOTE — TELEPHONE ENCOUNTER
Patient called stating she needs a doctor to doctor referral to see a cardiologist in [place] where she is currently staying. Please call patient back for additional details.

## 2023-11-28 NOTE — TELEPHONE ENCOUNTER
Left message for patient to call back. Please get a reason for cardio referral if patient calls back.     Audie Jimenez CMA

## 2024-07-08 ENCOUNTER — TELEPHONE (OUTPATIENT)
Age: 64
End: 2024-07-08

## 2024-07-08 NOTE — TELEPHONE ENCOUNTER
I'm sorry but I can't overbook on those days.  I am going on vacation on the 20th.  She should probably look into getting a physician in [place]. That way if she is sick she has someone local who knows her.    Thank you,  Wilma Gr, DO

## 2024-07-08 NOTE — TELEPHONE ENCOUNTER
Radha called in to make an appointment with Dr Gr for a Physical she stated that she currently lives in [place] but comes down to do her appointments still. She was looking for an appointment for either 7/19 or 7/22 as she will be here. Please Advise Thank you

## 2024-07-09 NOTE — TELEPHONE ENCOUNTER
EMBEROM that we will not be able to accommodate her apt for a physical on the days she will be in town from [place] and that it was recommended she find a DrTomy In [place] NFA

## (undated) DEVICE — MAYO STAND COVER: Brand: CONVERTORS

## (undated) DEVICE — 3000CC GUARDIAN II: Brand: GUARDIAN

## (undated) DEVICE — CYSTO TUBING SINGLE IRRIGATION

## (undated) DEVICE — CONVERTORS UNDER BUTTOCKS DRAPE WITH FLUID CONTROL POUCH II: Brand: CONVERTORS

## (undated) DEVICE — TUBING SUCTION 5MM X 12 FT

## (undated) DEVICE — PVC URETHRAL CATHETER: Brand: DOVER

## (undated) DEVICE — BETHLEHEM UNIVERSAL MINOR VAG: Brand: CARDINAL HEALTH

## (undated) DEVICE — GLOVE PI ULTRA TOUCH SZ.7.0

## (undated) DEVICE — GLOVE INDICATOR PI UNDERGLOVE SZ 7 BLUE